# Patient Record
Sex: FEMALE | Race: WHITE | NOT HISPANIC OR LATINO | Employment: FULL TIME | URBAN - METROPOLITAN AREA
[De-identification: names, ages, dates, MRNs, and addresses within clinical notes are randomized per-mention and may not be internally consistent; named-entity substitution may affect disease eponyms.]

---

## 2017-10-16 ENCOUNTER — OFFICE VISIT (OUTPATIENT)
Dept: FAMILY MEDICINE CLINIC | Facility: CLINIC | Age: 27
End: 2017-10-16

## 2017-10-16 VITALS
WEIGHT: 199 LBS | DIASTOLIC BLOOD PRESSURE: 64 MMHG | SYSTOLIC BLOOD PRESSURE: 115 MMHG | HEART RATE: 84 BPM | TEMPERATURE: 98 F | RESPIRATION RATE: 16 BRPM | BODY MASS INDEX: 31.23 KG/M2 | HEIGHT: 67 IN | OXYGEN SATURATION: 98 %

## 2017-10-16 DIAGNOSIS — J30.2 CHRONIC SEASONAL ALLERGIC RHINITIS, UNSPECIFIED TRIGGER: Primary | ICD-10-CM

## 2017-10-16 PROBLEM — F32.A DEPRESSION: Status: ACTIVE | Noted: 2017-10-16

## 2017-10-16 PROBLEM — M51.369 DDD (DEGENERATIVE DISC DISEASE), LUMBAR: Status: ACTIVE | Noted: 2017-10-16

## 2017-10-16 PROBLEM — Z15.01 BRCA1 POSITIVE: Status: ACTIVE | Noted: 2017-10-16

## 2017-10-16 PROBLEM — M51.36 DDD (DEGENERATIVE DISC DISEASE), LUMBAR: Status: ACTIVE | Noted: 2017-10-16

## 2017-10-16 PROBLEM — Z15.09 BRCA1 POSITIVE: Status: ACTIVE | Noted: 2017-10-16

## 2017-10-16 PROBLEM — F41.9 ANXIETY: Status: ACTIVE | Noted: 2017-10-16

## 2017-10-16 PROCEDURE — 99203 OFFICE O/P NEW LOW 30 MIN: CPT | Performed by: INTERNAL MEDICINE

## 2017-10-16 RX ORDER — CETIRIZINE HYDROCHLORIDE, PSEUDOEPHEDRINE HYDROCHLORIDE 5; 120 MG/1; MG/1
1 TABLET, FILM COATED, EXTENDED RELEASE ORAL 2 TIMES DAILY
Qty: 30 TABLET | Refills: 6 | Status: SHIPPED | OUTPATIENT
Start: 2017-10-16 | End: 2018-08-14 | Stop reason: SDUPTHER

## 2017-10-16 RX ORDER — CETIRIZINE HYDROCHLORIDE 10 MG/1
10 TABLET ORAL DAILY
COMMUNITY
End: 2018-02-19 | Stop reason: SDUPTHER

## 2017-10-17 PROBLEM — J30.2 SEASONAL ALLERGIC RHINITIS: Status: ACTIVE | Noted: 2017-10-17

## 2017-10-17 NOTE — PROGRESS NOTES
Subjective   Precious Daniels is a 27 y.o. female. Patient is here today for   Chief Complaint   Patient presents with   • Allergies     new patient           Vitals:    10/16/17 1424   BP: 115/64   Pulse: 84   Resp: 16   Temp: 98 °F (36.7 °C)   SpO2: 98%       Past Medical History:   Diagnosis Date   • Allergic    • Anxiety    • Hypothyroidism       Allergies   Allergen Reactions   • Molds & Smuts    • Penicillins       Social History     Social History   • Marital status:      Spouse name: N/A   • Number of children: N/A   • Years of education: N/A     Occupational History   • Not on file.     Social History Main Topics   • Smoking status: Never Smoker   • Smokeless tobacco: Never Used   • Alcohol use Yes   • Drug use: Not on file   • Sexual activity: Not on file     Other Topics Concern   • Not on file     Social History Narrative   • No narrative on file        Current Outpatient Prescriptions:   •  cetirizine (zyrTEC) 10 MG tablet, Take 10 mg by mouth Daily., Disp: , Rfl:   •  cetirizine-pseudoephedrine (ZYRTEC-D ALLERGY & CONGESTION) 5-120 MG per 12 hr tablet, Take 1 tablet by mouth 2 (Two) Times a Day., Disp: 30 tablet, Rfl: 6     Objective     HPI Comments:   This 27-year-old young lady wishes to establish her care at this office.    She has a history of seasonal allergic rhinitis.    Allergies          Review of Systems   Constitutional: Negative.    HENT: Negative.    Respiratory: Negative.    Cardiovascular: Negative.    Musculoskeletal: Negative.    Psychiatric/Behavioral: Negative.        Physical Exam   Constitutional: She is oriented to person, place, and time. She appears well-developed and well-nourished.   Pleasant, neatly groomed, BMI 31.   HENT:   Head: Normocephalic and atraumatic.   Pulmonary/Chest: Effort normal.   Neurological: She is alert and oriented to person, place, and time.   Psychiatric: She has a normal mood and affect. Her behavior is normal.   Nursing note and vitals  reviewed.        Problem List Items Addressed This Visit        Respiratory    Seasonal allergic rhinitis - Primary            PLAN  I gave her prescription for Zyrtec-D.    She may follow-up for a comprehensive physical examination at her convenience.  No Follow-up on file.

## 2018-02-01 ENCOUNTER — TELEPHONE (OUTPATIENT)
Dept: FAMILY MEDICINE CLINIC | Facility: CLINIC | Age: 28
End: 2018-02-01

## 2018-02-01 RX ORDER — OSELTAMIVIR PHOSPHATE 75 MG/1
75 CAPSULE ORAL DAILY
Qty: 10 CAPSULE | Refills: 0 | Status: SHIPPED | OUTPATIENT
Start: 2018-02-01 | End: 2018-02-19

## 2018-02-01 NOTE — TELEPHONE ENCOUNTER
-----OK DONE       Message from Bharti Hanley MA sent at 2/1/2018 11:39 AM EST -----  Contact: PT  PT SAYS HER WIFE HAS CONFIRMED FLU AND WAS PRESCRIBED TAMIFLU PT WOULD LIKE TAMIFLU CALLED INTO HER PHARMACY FOR HER   PT IS USING [id number] - [place], [place] - [address] [place] [address] AT [facility] [place] [facility] [place] [address] [place]  - [phone]  - [phone] FX    PT CAN BE REACHED [phone]

## 2018-02-19 ENCOUNTER — OFFICE VISIT (OUTPATIENT)
Dept: FAMILY MEDICINE CLINIC | Facility: CLINIC | Age: 28
End: 2018-02-19

## 2018-02-19 VITALS
HEIGHT: 67 IN | TEMPERATURE: 98.2 F | OXYGEN SATURATION: 97 % | WEIGHT: 211.2 LBS | RESPIRATION RATE: 16 BRPM | BODY MASS INDEX: 33.15 KG/M2 | DIASTOLIC BLOOD PRESSURE: 70 MMHG | HEART RATE: 81 BPM | SYSTOLIC BLOOD PRESSURE: 120 MMHG

## 2018-02-19 DIAGNOSIS — R00.2 PALPITATIONS: ICD-10-CM

## 2018-02-19 DIAGNOSIS — R07.89 ATYPICAL CHEST PAIN: Primary | ICD-10-CM

## 2018-02-19 DIAGNOSIS — K21.9 GASTROESOPHAGEAL REFLUX DISEASE, ESOPHAGITIS PRESENCE NOT SPECIFIED: ICD-10-CM

## 2018-02-19 DIAGNOSIS — Z83.3 FAMILY HISTORY OF TYPE 2 DIABETES MELLITUS: ICD-10-CM

## 2018-02-19 PROCEDURE — 99214 OFFICE O/P EST MOD 30 MIN: CPT | Performed by: INTERNAL MEDICINE

## 2018-02-19 PROCEDURE — 93000 ELECTROCARDIOGRAM COMPLETE: CPT | Performed by: INTERNAL MEDICINE

## 2018-02-19 NOTE — PROGRESS NOTES
Subjective   Precious Daniels is a 27 y.o. female. Patient is here today for   Chief Complaint   Patient presents with   • Diabetes     pt states has family history of diabetes and just wants to make sure she is up to date on labs    • Breast Problem     pt states would like to make sure she is up to date on mammo, because she does have a family history of breast cancer           Vitals:    02/19/18 0811   BP: 120/70   Pulse: 81   Resp: 16   Temp: 98.2 °F (36.8 °C)   SpO2: 97%       Past Medical History:   Diagnosis Date   • Allergic    • Anxiety    • Hypothyroidism       Allergies   Allergen Reactions   • Molds & Smuts    • Penicillins       Social History     Social History   • Marital status:      Spouse name: N/A   • Number of children: N/A   • Years of education: N/A     Occupational History   • Not on file.     Social History Main Topics   • Smoking status: Never Smoker   • Smokeless tobacco: Never Used   • Alcohol use Yes   • Drug use: Not on file   • Sexual activity: Not on file     Other Topics Concern   • Not on file     Social History Narrative        Current Outpatient Prescriptions:   •  cetirizine-pseudoephedrine (ZYRTEC-D ALLERGY & CONGESTION) 5-120 MG per 12 hr tablet, Take 1 tablet by mouth 2 (Two) Times a Day., Disp: 30 tablet, Rfl: 6     Objective     HPI Comments: This patient has had 3-4 episodes over the last 2-3 months which consist of a fluttering sensation in her chest at times with radiation into her neck.  When these occasions occur, it is not when she is exercising which she doesn't regular basis.    She does not have any associated nausea vomiting or diaphoresis.    Her episodes last for 20 or 30 minutes to time before they resolved.  Next    She has a family history significant for early coronary artery disease in her father and in every one of his several brothers.    She has a family history significant for type 2 diabetes.    She has a personal history of morbid obesity.  She is  status post gastric sleeve I believe.  When she was at her heaviest she weighed about 280 pounds.    She has frequent reflux symptoms that bother her mostly at night and she is going to bed.  She carries Tums or Rolaids both in her purse and keeps him at the bedside as well.    Diabetes     Breast Problem          Review of Systems   Constitutional: Negative.    HENT: Negative.    Respiratory: Negative.    Cardiovascular:        As described in the history of present illness.   Musculoskeletal: Negative.    Psychiatric/Behavioral: Negative.        Physical Exam   Constitutional: She appears well-developed and well-nourished. No distress.   HENT:   Head: Normocephalic and atraumatic.   Cardiovascular: Normal rate, regular rhythm and normal heart sounds.    No murmur heard.  Pulmonary/Chest: Effort normal and breath sounds normal.   Psychiatric: She has a normal mood and affect. Her behavior is normal. Thought content normal.   Nursing note and vitals reviewed.    An electrocardiogram today showed normal sinus rhythm with sinus arrhythmia.  There were no ST or T wave changes suggestive of ischemia.    Problem List Items Addressed This Visit        Cardiovascular and Mediastinum    Palpitations    Relevant Orders    Ambulatory Referral to Cardiology       Digestive    GERD (gastroesophageal reflux disease) - Primary       Nervous and Auditory    Atypical chest pain    Relevant Orders    Ambulatory Referral to Cardiology       Other    Family history of type 2 diabetes mellitus            PLAN  She and I had a lengthy discussion about insulin resistance, family history of diabetes and the development of type 2 diabetes in association with obesity.      I like to get some lab work.  She requested that I give her an order so that she could go to [facility] and I did today.  She will get the following labs: Hemoglobin A1c, insulin level, CMP, CBC, urinalysis, TSH with reflex free T4.    I recommended that she try  over-the-counter omeprazole for her reflux symptoms.    I made a referral for her to see cardiology about her atypical chest pain.  I explained that I felt that she may get some testing to sort this problem out.  No Follow-up on file.

## 2018-03-08 ENCOUNTER — OFFICE VISIT (OUTPATIENT)
Dept: CARDIOLOGY | Facility: CLINIC | Age: 28
End: 2018-03-08

## 2018-03-08 VITALS
SYSTOLIC BLOOD PRESSURE: 100 MMHG | BODY MASS INDEX: 33.59 KG/M2 | DIASTOLIC BLOOD PRESSURE: 60 MMHG | WEIGHT: 209 LBS | HEIGHT: 66 IN | HEART RATE: 73 BPM

## 2018-03-08 DIAGNOSIS — R00.2 PALPITATIONS: Primary | ICD-10-CM

## 2018-03-08 PROCEDURE — 99203 OFFICE O/P NEW LOW 30 MIN: CPT | Performed by: INTERNAL MEDICINE

## 2018-03-08 PROCEDURE — 93000 ELECTROCARDIOGRAM COMPLETE: CPT | Performed by: INTERNAL MEDICINE

## 2018-03-08 NOTE — PROGRESS NOTES
Date of Office Visit: 2018  Encounter Provider: Cosme Bradley MD  Place of Service: [facility] CARDIOLOGY  Patient Name: Precious Daniels  :1990  Chavez Yeung MD    Chief Complaint   Patient presents with   • Palpitations     History of Present Illness  The patient is a 89-year-old white female who was kindly referred by Dr. Curry for evaluation of palpitations.    The patient's symptoms began several months ago.  She feels strange sensation in her upper precordium.  Occasional she'll notice that is cool sensation up in her neck and her jaw.  Her episodes can last for a few minutes at a time.  They are not provoked by any exertional activity.  They do not awaken her from sleep.  She does not have any discomfort when she exerts herself on a routine basis.  Most of the time her symptoms occur just prior to eating.    This patient has had a history of obesity for many many years.  At one point she had a gastric banding operation which she lost about 150 pounds.  She is now back up to 209 pounds again of roughly 60 pounds since her apparatus was deflated.  She has a remote history of thyroid disease as well as sleep apnea.  Both the sleep apnea and thyroid disease appear to have resolved over time.      Past Medical History:   Diagnosis Date   • Allergic    • Anxiety    • Chest pain    • GERD (gastroesophageal reflux disease)    • Hypothyroidism    • Palpitations          Past Surgical History:   Procedure Laterality Date   • CHOLECYSTECTOMY     • LAPAROSCOPIC GASTRIC BANDING             Current Outpatient Prescriptions:   •  cetirizine-pseudoephedrine (ZYRTEC-D ALLERGY & CONGESTION) 5-120 MG per 12 hr tablet, Take 1 tablet by mouth 2 (Two) Times a Day., Disp: 30 tablet, Rfl: 6      Social History     Social History   • Marital status:      Spouse name: N/A   • Number of children: N/A   • Years of education: N/A     Occupational History   • Not on file.     Social  "History Main Topics   • Smoking status: Never Smoker   • Smokeless tobacco: Never Used   • Alcohol use Yes   • Drug use: Not on file   • Sexual activity: Not on file     Other Topics Concern   • Not on file     Social History Narrative         Review of Systems   Constitution: Negative.   HENT: Negative.    Eyes: Negative.    Cardiovascular: Negative.    Respiratory: Negative.    Endocrine: Negative.    Skin: Negative.    Musculoskeletal: Negative.    Gastrointestinal: Negative.    Neurological: Negative.    Psychiatric/Behavioral: Negative.        Procedures      ECG 12 Lead  Date/Time: 3/8/2018 1:32 PM  Performed by: NESSA RAWLS  Authorized by: NESSA RAWLS   Comparison: compared with previous ECG from 2/19/2018  Similar to previous ECG  Rhythm: sinus rhythm  Rate: normal  Conduction: conduction normal  ST Segments: ST segments normal  T Waves: T waves normal  QRS axis: right                 Objective:    /60  Pulse 73  Ht 167.6 cm (66\")  Wt 94.8 kg (209 lb)  BMI 33.73 kg/m2        Physical Exam   Constitutional: She is oriented to person, place, and time. She appears well-developed and well-nourished.   Overweight   HENT:   Head: Normocephalic.   Eyes: Pupils are equal, round, and reactive to light.   Neck: Normal range of motion. No JVD present. Carotid bruit is not present. No thyromegaly present.   Cardiovascular: Normal rate, regular rhythm, S1 normal, S2 normal, normal heart sounds and intact distal pulses.  Exam reveals no gallop and no friction rub.    No murmur heard.  Pulmonary/Chest: Effort normal and breath sounds normal.   Abdominal: Soft. Bowel sounds are normal.   Musculoskeletal: She exhibits no edema.   Neurological: She is alert and oriented to person, place, and time.   Skin: Skin is warm, dry and intact. No erythema.   Psychiatric: She has a normal mood and affect.   Vitals reviewed.          Assessment:       Diagnosis Plan   1. Palpitations       The patient has very " vague symptoms.  He didn't auscultate any arrhythmia on exam.  There were no murmurs noted.  Her electrocardiogram remains normal.  I honestly think what is playing this patient is anxiety issues.  Her symptoms occur just prior to eating.  She states eating for her is extremely stressful because of the intensity it takes for her to make the right choices.  What may be more beneficial to this patient and asked expressed this to her is some form of psychological counseling to help her deal with her daily stress and her obesity.  I don't think any cardiac testing is indicated at this point.  At her age with her risk factor profile I don't think she has coronary disease.   Plan:       Preshaped the opportunity to evaluate this patient in consultation

## 2018-08-14 RX ORDER — CETIRIZINE HCL/PSEUDOEPHEDRINE 5 MG-120MG
TABLET, EXTENDED RELEASE 12 HR ORAL
Qty: 60 TABLET | Refills: 0 | Status: SHIPPED | OUTPATIENT
Start: 2018-08-14 | End: 2018-11-20 | Stop reason: SDUPTHER

## 2018-11-21 RX ORDER — CETIRIZINE HCL/PSEUDOEPHEDRINE 5 MG-120MG
TABLET, EXTENDED RELEASE 12 HR ORAL
Qty: 180 TABLET | Refills: 0 | Status: SHIPPED | OUTPATIENT
Start: 2018-11-21 | End: 2019-02-26 | Stop reason: SDUPTHER

## 2019-01-21 DIAGNOSIS — Z00.00 ROUTINE HEALTH MAINTENANCE: Primary | ICD-10-CM

## 2019-02-01 ENCOUNTER — CLINICAL SUPPORT (OUTPATIENT)
Dept: FAMILY MEDICINE CLINIC | Facility: CLINIC | Age: 29
End: 2019-02-01

## 2019-02-01 DIAGNOSIS — Z00.00 ENCOUNTER FOR PREVENTIVE HEALTH EXAMINATION: Primary | ICD-10-CM

## 2019-02-01 LAB
ALBUMIN SERPL-MCNC: 4.2 G/DL (ref 3.5–5.2)
ALBUMIN/GLOB SERPL: 1.8 G/DL
ALP SERPL-CCNC: 67 U/L (ref 39–117)
ALT SERPL-CCNC: 13 U/L (ref 1–33)
APPEARANCE UR: ABNORMAL
AST SERPL-CCNC: 13 U/L (ref 1–32)
BACTERIA #/AREA URNS HPF: ABNORMAL /HPF
BILIRUB SERPL-MCNC: 0.3 MG/DL (ref 0.1–1.2)
BILIRUB UR QL STRIP: NEGATIVE
BUN SERPL-MCNC: 8 MG/DL (ref 6–20)
BUN/CREAT SERPL: 11.8 (ref 7–25)
CALCIUM SERPL-MCNC: 9.7 MG/DL (ref 8.6–10.5)
CASTS URNS MICRO: ABNORMAL
CHLORIDE SERPL-SCNC: 100 MMOL/L (ref 98–107)
CHOLEST SERPL-MCNC: 170 MG/DL (ref 0–200)
CO2 SERPL-SCNC: 26.7 MMOL/L (ref 22–29)
COLOR UR: YELLOW
CREAT SERPL-MCNC: 0.68 MG/DL (ref 0.57–1)
CRYSTALS URNS MICRO: ABNORMAL
EPI CELLS #/AREA URNS HPF: ABNORMAL /HPF
GLOBULIN SER CALC-MCNC: 2.4 GM/DL
GLUCOSE SERPL-MCNC: 75 MG/DL (ref 65–99)
GLUCOSE UR QL: NEGATIVE
HDLC SERPL-MCNC: 69 MG/DL (ref 40–60)
HGB UR QL STRIP: ABNORMAL
KETONES UR QL STRIP: ABNORMAL
LDLC SERPL CALC-MCNC: 89 MG/DL (ref 0–100)
LDLC/HDLC SERPL: 1.3 {RATIO}
LEUKOCYTE ESTERASE UR QL STRIP: ABNORMAL
MUCOUS THREADS URNS QL MICRO: ABNORMAL /HPF
NITRITE UR QL STRIP: NEGATIVE
PH UR STRIP: <=5 [PH] (ref 5–8)
POTASSIUM SERPL-SCNC: 4.4 MMOL/L (ref 3.5–5.2)
PROT SERPL-MCNC: 6.6 G/DL (ref 6–8.5)
PROT UR QL STRIP: NEGATIVE
RBC #/AREA URNS HPF: ABNORMAL /HPF
SODIUM SERPL-SCNC: 142 MMOL/L (ref 136–145)
SP GR UR: ABNORMAL (ref 1–1.03)
TRIGL SERPL-MCNC: 58 MG/DL (ref 0–150)
UROBILINOGEN UR STRIP-MCNC: ABNORMAL MG/DL
VLDLC SERPL CALC-MCNC: 11.6 MG/DL (ref 5–40)
WBC #/AREA URNS HPF: ABNORMAL /HPF

## 2019-02-01 PROCEDURE — 93000 ELECTROCARDIOGRAM COMPLETE: CPT | Performed by: INTERNAL MEDICINE

## 2019-02-01 PROCEDURE — 71046 X-RAY EXAM CHEST 2 VIEWS: CPT | Performed by: INTERNAL MEDICINE

## 2019-02-01 PROCEDURE — 85025 COMPLETE CBC W/AUTO DIFF WBC: CPT | Performed by: INTERNAL MEDICINE

## 2019-02-27 RX ORDER — CETIRIZINE HCL/PSEUDOEPHEDRINE 5 MG-120MG
TABLET, EXTENDED RELEASE 12 HR ORAL
Qty: 60 TABLET | Refills: 0 | Status: SHIPPED | OUTPATIENT
Start: 2019-02-27 | End: 2020-01-24

## 2019-03-21 ENCOUNTER — OFFICE VISIT (OUTPATIENT)
Dept: FAMILY MEDICINE CLINIC | Facility: CLINIC | Age: 29
End: 2019-03-21

## 2019-03-21 VITALS
TEMPERATURE: 98.4 F | HEIGHT: 71 IN | SYSTOLIC BLOOD PRESSURE: 100 MMHG | RESPIRATION RATE: 16 BRPM | WEIGHT: 214.6 LBS | DIASTOLIC BLOOD PRESSURE: 64 MMHG | BODY MASS INDEX: 30.04 KG/M2

## 2019-03-21 DIAGNOSIS — E66.3 OVERWEIGHT (BMI 25.0-29.9): ICD-10-CM

## 2019-03-21 DIAGNOSIS — Z00.00 ROUTINE HEALTH MAINTENANCE: Primary | ICD-10-CM

## 2019-03-21 DIAGNOSIS — Z83.3 FAMILY HISTORY OF TYPE 2 DIABETES MELLITUS: ICD-10-CM

## 2019-03-21 PROBLEM — Z15.01 BRCA1 POSITIVE: Status: RESOLVED | Noted: 2017-10-16 | Resolved: 2019-03-21

## 2019-03-21 PROBLEM — Z15.09 BRCA1 POSITIVE: Status: RESOLVED | Noted: 2017-10-16 | Resolved: 2019-03-21

## 2019-03-21 PROBLEM — F32.A DEPRESSION: Status: RESOLVED | Noted: 2017-10-16 | Resolved: 2019-03-21

## 2019-03-21 PROCEDURE — 99395 PREV VISIT EST AGE 18-39: CPT | Performed by: INTERNAL MEDICINE

## 2019-03-21 RX ORDER — CALCIUM CARBONATE 750 MG/1
750 TABLET, CHEWABLE ORAL DAILY
COMMUNITY

## 2019-03-21 NOTE — PROGRESS NOTES
Subjective   Precious Daniels is a 28 y.o. female. Patient is here today for   Chief Complaint   Patient presents with   • Annual Exam     cpe           Vitals:    03/21/19 1442   BP: 100/64   Resp: 16   Temp: 98.4 °F (36.9 °C)       The following portions of the patient's history were reviewed and updated as appropriate: allergies, current medications, past family history, past medical history, past social history, past surgical history and problem list.    Past Medical History:   Diagnosis Date   • Allergic    • Anxiety    • Chest pain    • GERD (gastroesophageal reflux disease)    • Hypothyroidism    • Palpitations       Allergies   Allergen Reactions   • Molds & Smuts    • Penicillins       Social History     Socioeconomic History   • Marital status:      Spouse name: Not on file   • Number of children: Not on file   • Years of education: Not on file   • Highest education level: Not on file   Tobacco Use   • Smoking status: Never Smoker   • Smokeless tobacco: Never Used   Substance and Sexual Activity   • Alcohol use: Yes        Current Outpatient Medications:   •  calcium carbonate EX (TUMS EX) 750 MG chewable tablet, Chew 750 mg Daily., Disp: , Rfl:   •  Cholecalciferol (VITAMIN D PO), Take  by mouth., Disp: , Rfl:   •  MULTIPLE VITAMINS-MINERALS PO, Take  by mouth., Disp: , Rfl:   •  ZYRTEC-D ALLERGY & CONGESTION 5-120 MG per 12 hr tablet, TAKE 1 TABLET BY MOUTH TWICE DAILY, Disp: 60 tablet, Rfl: 0     EKG  ECG Report    Objective   This pleasant 28-year-old is here for a comprehensive physical exam.  She follows up with gynecologist regularly.    Past surgical history includes laparoscopic banding procedure.    She has a family history of type 2 diabetes.           Precious Daniels 28 y.o. female who presents for an Annual Wellness Visit.  she has a history of   Patient Active Problem List   Diagnosis   • Anxiety   • BRCA1 positive   • DDD (degenerative disc disease), lumbar   • Depression   • Seasonal  allergic rhinitis   • Atypical chest pain   • GERD (gastroesophageal reflux disease)   • Family history of type 2 diabetes mellitus   • Palpitations   .  she has been doing well with new interval problems.  Labs results discussed in detail with the patient.  Plan to update vaccines if needed today.        Lab Results (most recent)     None            Review of Systems   Constitutional: Negative.    HENT: Negative.    Eyes: Negative.    Respiratory: Negative.    Cardiovascular: Negative.    Gastrointestinal: Negative.    Endocrine: Negative.    Genitourinary: Negative.    Musculoskeletal: Negative.    Skin: Negative.    Allergic/Immunologic: Negative.    Neurological: Negative.    Hematological: Negative.    Psychiatric/Behavioral: Negative.        Physical Exam   Constitutional: She is oriented to person, place, and time. She appears well-developed and well-nourished. No distress.   Pleasant, neatly groomed, BMI 29.   HENT:   Head: Normocephalic and atraumatic.   Right Ear: External ear normal.   Left Ear: External ear normal.   Nose: Nose normal.   Mouth/Throat: Oropharynx is clear and moist. No oropharyngeal exudate.   Eyes: Conjunctivae and EOM are normal. Pupils are equal, round, and reactive to light. Right eye exhibits no discharge. Left eye exhibits no discharge. No scleral icterus.   Neck: Normal range of motion. Neck supple. No JVD present. No tracheal deviation present. No thyromegaly present.   Cardiovascular: Normal rate, regular rhythm, normal heart sounds and intact distal pulses. Exam reveals no gallop and no friction rub.   No murmur heard.  Pulmonary/Chest: Effort normal and breath sounds normal. No stridor. No respiratory distress. She has no wheezes. She has no rales. She exhibits no tenderness.   Abdominal: Soft. Bowel sounds are normal. She exhibits no distension and no mass. There is no tenderness. There is no rebound and no guarding. No hernia.   Genitourinary:   Genitourinary Comments:  Deferred to gynecology.   Musculoskeletal: Normal range of motion. She exhibits no edema, tenderness or deformity.   Lymphadenopathy:     She has no cervical adenopathy.   Neurological: She is alert and oriented to person, place, and time.   Skin: Skin is warm and dry. Capillary refill takes less than 2 seconds. No rash noted. She is not diaphoretic. No erythema. No pallor.   Psychiatric: She has a normal mood and affect. Her behavior is normal. Judgment and thought content normal.   Nursing note and vitals reviewed.      ASSESSMENT       Problem List Items Addressed This Visit     None      Visit Diagnoses     Routine health maintenance    -  Primary    Relevant Orders    XR Chest PA & Lateral          PLAN  She and I reviewed her labs.  Everything looked great except that she did have some leukocytes and a nitrite in her urinalysis.  Repeat urinalysis today shows no abnormality.    She is overweight.  She has had a great experience with weight loss regarding the history of bariatric surgery.  She continues to do her best to lose weight.    I asked her to follow-up in about 1 year.  I would do a comprehensive physical examination at that time.  There are no Patient Instructions on file for this visit.    No Follow-up on file.

## 2019-12-04 ENCOUNTER — TELEPHONE (OUTPATIENT)
Dept: FAMILY MEDICINE CLINIC | Facility: CLINIC | Age: 29
End: 2019-12-04

## 2019-12-04 NOTE — TELEPHONE ENCOUNTER
----- Message from Chris May sent at 12/4/2019 11:28 AM EST -----  PT OUT OF TOWN AND A DR AT A CLINIC PRESCRIBED SOME MEDICATION FOR A COLD. AND HAD QUESTIONS ABOUT THE MEDICATION THAT WAS PRESCRIBED.     CEFDINIR 300MG CAPS (TWO DAY/ 7DAYS) HAS TAKEN 5DAYS WORTH.     PT IS HAVING A SIDE AFFECT OF THIS MEDS.  DIARHEA, NAUSEA, ABDOMINAL CRAMPS, MUSCLES WEAKNESS. TALKED TO DR AND HE SAID STOP TAKING IT. PT WANTS DR OPINION.    TOLD PT TO GO [facility] OR ER TO BE SEEN    [phone]    THANKS CHRIS

## 2019-12-04 NOTE — TELEPHONE ENCOUNTER
Yes as we discussed in person, patient should stop taking the medication and needs to be seen for possible complications from side effects and to see about getting another medication if it is still needed. Thanks Chris

## 2020-01-24 RX ORDER — CETIRIZINE HCL/PSEUDOEPHEDRINE 5 MG-120MG
TABLET, EXTENDED RELEASE 12 HR ORAL
Qty: 60 TABLET | Refills: 2 | Status: SHIPPED | OUTPATIENT
Start: 2020-01-24 | End: 2020-04-20 | Stop reason: SDUPTHER

## 2020-04-20 ENCOUNTER — TELEPHONE (OUTPATIENT)
Dept: FAMILY MEDICINE CLINIC | Facility: CLINIC | Age: 30
End: 2020-04-20

## 2020-04-20 RX ORDER — CETIRIZINE HYDROCHLORIDE, PSEUDOEPHEDRINE HYDROCHLORIDE 5; 120 MG/1; MG/1
1 TABLET, FILM COATED, EXTENDED RELEASE ORAL 2 TIMES DAILY
Qty: 180 TABLET | Refills: 0 | Status: SHIPPED | OUTPATIENT
Start: 2020-04-20 | End: 2022-06-02 | Stop reason: SDUPTHER

## 2020-04-20 NOTE — TELEPHONE ENCOUNTER
PATIENT IS REQUESTING A REFILL ON ZYRTEC-D ALLERGY & CONGESTION 5-120 MG per 12 hr tablet.    PLEASE SEND TO [facility] [place]  [address] [place] [address] [phone] [facility] [phone] FX

## 2020-11-10 ENCOUNTER — TELEPHONE (OUTPATIENT)
Dept: FAMILY MEDICINE CLINIC | Facility: CLINIC | Age: 30
End: 2020-11-10

## 2020-11-10 NOTE — TELEPHONE ENCOUNTER
[facility]     We do not have the testing here. Pt needs to be evaluated. Pt needs to be tested for COVID as well. Pt is to go to the Urgent Care treatment center for evaluation.

## 2020-11-10 NOTE — TELEPHONE ENCOUNTER
PATIENT AND PATIENT'S WIFE (MARY) IS EXPERIENCING THE FOLLOWING SYMPTOMS,    DIARRHEA  NAUSEA  MUSCLE ACHES  HEADACHES    PATIENT STATED SHE CAME IN CONTACT WITH [facility] OVER THE WEEKEND AND STARTED TO DEVELOP THESE SYMPTOMS. PATIENT IS REQUESTING TEST KIT TO TEST FOR [facility].    PHARMACY CONFIRMED: [facility] [address] [facility]    PLEASE ADVISE.    PATIENT CALL BACK:  [phone]

## 2021-02-18 RX ORDER — CETIRIZINE HCL/PSEUDOEPHEDRINE 5 MG-120MG
TABLET, EXTENDED RELEASE 12 HR ORAL
Qty: 60 TABLET | OUTPATIENT
Start: 2021-02-18

## 2021-04-16 ENCOUNTER — BULK ORDERING (OUTPATIENT)
Dept: CASE MANAGEMENT | Facility: OTHER | Age: 31
End: 2021-04-16

## 2021-04-16 DIAGNOSIS — Z23 IMMUNIZATION DUE: ICD-10-CM

## 2021-06-14 ENCOUNTER — TELEPHONE (OUTPATIENT)
Dept: FAMILY MEDICINE CLINIC | Facility: CLINIC | Age: 31
End: 2021-06-14

## 2021-06-14 DIAGNOSIS — E66.3 OVERWEIGHT (BMI 25.0-29.9): ICD-10-CM

## 2021-06-14 DIAGNOSIS — Z83.3 FAMILY HISTORY OF TYPE 2 DIABETES MELLITUS: Primary | ICD-10-CM

## 2021-06-18 ENCOUNTER — OFFICE VISIT (OUTPATIENT)
Dept: FAMILY MEDICINE CLINIC | Facility: CLINIC | Age: 31
End: 2021-06-18

## 2021-06-18 ENCOUNTER — HOSPITAL ENCOUNTER (OUTPATIENT)
Dept: GENERAL RADIOLOGY | Facility: HOSPITAL | Age: 31
Discharge: HOME | End: 2021-06-18
Admitting: INTERNAL MEDICINE

## 2021-06-18 VITALS
DIASTOLIC BLOOD PRESSURE: 82 MMHG | WEIGHT: 212 LBS | OXYGEN SATURATION: 98 % | SYSTOLIC BLOOD PRESSURE: 120 MMHG | HEIGHT: 67 IN | TEMPERATURE: 97.3 F | BODY MASS INDEX: 33.27 KG/M2 | HEART RATE: 74 BPM

## 2021-06-18 DIAGNOSIS — N30.00 ACUTE CYSTITIS WITHOUT HEMATURIA: ICD-10-CM

## 2021-06-18 DIAGNOSIS — M54.50 LUMBAR SPINE PAIN: ICD-10-CM

## 2021-06-18 DIAGNOSIS — E66.3 OVERWEIGHT (BMI 25.0-29.9): ICD-10-CM

## 2021-06-18 DIAGNOSIS — M54.50 LUMBAR SPINE PAIN: Primary | ICD-10-CM

## 2021-06-18 PROCEDURE — 72100 X-RAY EXAM L-S SPINE 2/3 VWS: CPT

## 2021-06-18 PROCEDURE — 99214 OFFICE O/P EST MOD 30 MIN: CPT | Performed by: INTERNAL MEDICINE

## 2021-06-18 RX ORDER — NITROFURANTOIN 25; 75 MG/1; MG/1
100 CAPSULE ORAL 2 TIMES DAILY
Qty: 10 CAPSULE | Refills: 0 | Status: SHIPPED | OUTPATIENT
Start: 2021-06-18 | End: 2022-06-02

## 2021-06-18 NOTE — PROGRESS NOTES
Subjective   Precious Daniels is a 30 y.o. female. Patient is here today for   Chief Complaint   Patient presents with   • Annual Exam   • Back Pain     Discuss referral to PT           Vitals:    21 0810   BP: 120/82   Pulse: 74   Temp: 97.3 °F (36.3 °C)   SpO2: 98%     Body mass index is 33.71 kg/m².      Past Medical History:   Diagnosis Date   • Allergic    • Anxiety    • BRCA1 positive 10/16/2017   • Chest pain    • GERD (gastroesophageal reflux disease)    • Hypothyroidism    • Palpitations       Allergies   Allergen Reactions   • Cefdinir Diarrhea   • Molds & Smuts    • Morphine Other (See Comments)     Makes patient agitated, and angry  Makes patient agitated, and angry     • Penicillins       Social History     Socioeconomic History   • Marital status:      Spouse name: Not on file   • Number of children: Not on file   • Years of education: Not on file   • Highest education level: Not on file   Tobacco Use   • Smoking status: Never Smoker   • Smokeless tobacco: Never Used   Vaping Use   • Vaping Use: Never used   Substance and Sexual Activity   • Alcohol use: Yes   • Sexual activity: Defer        Current Outpatient Medications:   •  calcium carbonate EX (TUMS EX) 750 MG chewable tablet, Chew 750 mg Daily., Disp: , Rfl:   •  cetirizine-pseudoephedrine (ZyrTEC-D Allergy & Congestion) 5-120 MG per 12 hr tablet, Take 1 tablet by mouth 2 (Two) Times a Day., Disp: 180 tablet, Rfl: 0  •  Prenatal Multivit-Min-Fe-FA (Pre- Formula) tablet, , Disp: , Rfl:   •  nitrofurantoin, macrocrystal-monohydrate, (Macrobid) 100 MG capsule, Take 1 capsule by mouth 2 (Two) Times a Day., Disp: 10 capsule, Rfl: 0     Objective     This patient is here for follow-up on labs done last week.    The past month complained of lumbar spine pain with radiation to the buttocks bilaterally.    She has twinges of pain the paraspinous muscles running superiorly bilaterally.  He is bit better now than she was when this started a  month ago.  Notes no preceding traumatic event.  It bothers her especially when she lays down to go to sleep at night.  She is a side sleeper and that tends to exacerbate her pain.    She had some labs done last week.       Review of Systems   Constitutional: Negative.    HENT: Negative.    Respiratory: Negative.    Cardiovascular: Negative.    Musculoskeletal: Negative.    Psychiatric/Behavioral: Negative.        Physical Exam  Vitals and nursing note reviewed.   Constitutional:       Appearance: Normal appearance. She is obese. She is not ill-appearing or diaphoretic.      Comments: Pleasant, neatly groomed, in no distress.   Cardiovascular:      Rate and Rhythm: Regular rhythm.      Heart sounds: Normal heart sounds. No murmur heard.   No gallop.    Musculoskeletal:      Comments: No pain to percussion of the lumbar spine.  No pain on palpation.   Neurological:      Mental Status: She is alert and oriented to person, place, and time.   Psychiatric:         Mood and Affect: Mood normal.         Behavior: Behavior normal.         Thought Content: Thought content normal.           Problems Addressed this Visit        Endocrine and Metabolic    Overweight (BMI 25.0-29.9)       Genitourinary and Reproductive     Acute cystitis without hematuria    Relevant Medications    nitrofurantoin, macrocrystal-monohydrate, (Macrobid) 100 MG capsule       Musculoskeletal and Injuries    Lumbar spine pain - Primary    Relevant Orders    Ambulatory Referral to Physical Therapy Evaluate and treat    XR Spine Lumbar 2 or 3 View      Diagnoses       Codes Comments    Lumbar spine pain    -  Primary ICD-10-CM: M54.5  ICD-9-CM: 724.2     Overweight (BMI 25.0-29.9)     ICD-10-CM: E66.3  ICD-9-CM: 278.02     Acute cystitis without hematuria     ICD-10-CM: N30.00  ICD-9-CM: 595.0             PLAN  She and I reviewed her labs.  Cholesterol looks good, comprehensive metabolic panel looks good.  Her urinalysis suggest that she has a urinary  tract infection.  I sent out a prescription for nitrofurantoin for her.    I put an order in for her to have a lumbar spine x-ray and made a referral for her to see physical therapy.    She and I briefly discussed weight loss and she is making efforts to lose weight.    She may take over-the-counter anti-inflammatories.  Aleve, or ibuprofen) as needed.    If she fails to improve with physical therapy, she will let me know and we will take it from there.  No follow-ups on file.

## 2021-06-23 ENCOUNTER — TELEPHONE (OUTPATIENT)
Dept: FAMILY MEDICINE CLINIC | Facility: CLINIC | Age: 31
End: 2021-06-23

## 2021-06-23 NOTE — TELEPHONE ENCOUNTER
PATIENT FINISHED THE LABS AND INQUIRED IF SHE NEEDS ANOTHER LAB TO DETERMINE IF THE BACTERIA IS GONE.  PLEASE ADVISE    CALL BACK #: [phone]

## 2021-06-28 DIAGNOSIS — N30.00 ACUTE CYSTITIS WITHOUT HEMATURIA: Primary | ICD-10-CM

## 2021-06-28 NOTE — TELEPHONE ENCOUNTER
PER DR. VIKTORIA RICKS TO ORDER A UA, CALLED AND INFORMED PT UA ORDERED AND SHE IS TO CALL OFFICE BACK TO SCHEDULE

## 2021-06-30 DIAGNOSIS — N30.00 ACUTE CYSTITIS WITHOUT HEMATURIA: Primary | ICD-10-CM

## 2021-07-01 LAB
APPEARANCE UR: ABNORMAL
BACTERIA #/AREA URNS HPF: ABNORMAL /HPF
BILIRUB UR QL STRIP: ABNORMAL
CASTS URNS MICRO: ABNORMAL
COLOR UR: ABNORMAL
CRYSTALS URNS MICRO: ABNORMAL
EPI CELLS #/AREA URNS HPF: ABNORMAL /HPF
GLUCOSE UR QL: NEGATIVE
HGB UR QL STRIP: NEGATIVE
KETONES UR QL STRIP: ABNORMAL
LEUKOCYTE ESTERASE UR QL STRIP: ABNORMAL
MUCOUS THREADS URNS QL MICRO: ABNORMAL /HPF
NITRITE UR QL STRIP: NEGATIVE
PH UR STRIP: 5.5 [PH] (ref 5–8)
PROT UR QL STRIP: ABNORMAL
RBC #/AREA URNS HPF: ABNORMAL /HPF
SP GR UR: ABNORMAL (ref 1–1.03)
UROBILINOGEN UR STRIP-MCNC: ABNORMAL MG/DL
WBC #/AREA URNS HPF: ABNORMAL /HPF

## 2021-07-02 ENCOUNTER — OFFICE VISIT (OUTPATIENT)
Dept: FAMILY MEDICINE CLINIC | Facility: CLINIC | Age: 31
End: 2021-07-02

## 2021-07-02 VITALS
SYSTOLIC BLOOD PRESSURE: 130 MMHG | OXYGEN SATURATION: 98 % | BODY MASS INDEX: 33.52 KG/M2 | TEMPERATURE: 97.7 F | HEIGHT: 66 IN | DIASTOLIC BLOOD PRESSURE: 72 MMHG | HEART RATE: 93 BPM | RESPIRATION RATE: 18 BRPM | WEIGHT: 208.6 LBS

## 2021-07-02 DIAGNOSIS — R80.9 PROTEINURIA, UNSPECIFIED TYPE: ICD-10-CM

## 2021-07-02 DIAGNOSIS — R31.9 HEMATURIA, UNSPECIFIED TYPE: Primary | ICD-10-CM

## 2021-07-02 LAB
BILIRUB BLD-MCNC: ABNORMAL MG/DL
CLARITY, POC: ABNORMAL
COLOR UR: ABNORMAL
GLUCOSE UR STRIP-MCNC: NEGATIVE MG/DL
KETONES UR QL: NEGATIVE
LEUKOCYTE EST, POC: NEGATIVE
NITRITE UR-MCNC: NEGATIVE MG/ML
PH UR: 6 [PH] (ref 5–8)
PROT UR STRIP-MCNC: ABNORMAL MG/DL
RBC # UR STRIP: ABNORMAL /UL
SP GR UR: 1.02 (ref 1–1.03)
UROBILINOGEN UR QL: NORMAL

## 2021-07-02 PROCEDURE — 99213 OFFICE O/P EST LOW 20 MIN: CPT | Performed by: NURSE PRACTITIONER

## 2021-07-02 PROCEDURE — 81003 URINALYSIS AUTO W/O SCOPE: CPT | Performed by: NURSE PRACTITIONER

## 2021-07-02 NOTE — PROGRESS NOTES
"Subjective     Precious Daniels is a 30 y.o.. female.     Pt here today following up on recent Cystitis. Pt stating she was asymptomatic and dx of routine u/a. Pt stating she was prescribed nitrofurantoin 100 mg bid x 5 days, she took as prescribed and completed on Wednesday this week. Pt denies any current issues. Pt denies any history of recurrent UTI's.      The following portions of the patient's history were reviewed and updated as appropriate: allergies, current medications, past family history, past medical history, past social history, past surgical history and problem list.    Past Medical History:   Diagnosis Date   • Allergic    • Anxiety    • BRCA1 positive 10/16/2017   • Chest pain    • GERD (gastroesophageal reflux disease)    • Hypothyroidism    • Palpitations        Past Surgical History:   Procedure Laterality Date   • CHOLECYSTECTOMY     • LAPAROSCOPIC GASTRIC BANDING         Review of Systems   Constitutional: Negative for fever.   Gastrointestinal: Negative for nausea and vomiting.   Genitourinary: Negative for dysuria, flank pain, frequency, hematuria and urgency.       Allergies   Allergen Reactions   • Cefdinir Diarrhea   • Molds & Smuts    • Morphine Other (See Comments)     Makes patient agitated, and angry  Makes patient agitated, and angry     • Penicillins        Objective     Vitals:    07/02/21 0833   BP: 130/72   BP Location: Left arm   Patient Position: Sitting   Pulse: 93   Resp: 18   Temp: 97.7 °F (36.5 °C)   TempSrc: Oral   SpO2: 98%   Weight: 94.6 kg (208 lb 9.6 oz)   Height: 168.9 cm (66.5\")     Body mass index is 33.17 kg/m².    Physical Exam  Vitals reviewed.   HENT:      Head: Normocephalic.   Eyes:      Pupils: Pupils are equal, round, and reactive to light.   Cardiovascular:      Rate and Rhythm: Normal rate and regular rhythm.   Pulmonary:      Effort: Pulmonary effort is normal.      Breath sounds: Normal breath sounds.   Musculoskeletal:         General: Normal range of " motion.   Neurological:      Mental Status: She is alert and oriented to person, place, and time.   Psychiatric:         Behavior: Behavior normal.           Current Outpatient Medications:   •  calcium carbonate EX (TUMS EX) 750 MG chewable tablet, Chew 750 mg Daily., Disp: , Rfl:   •  cetirizine-pseudoephedrine (ZyrTEC-D Allergy & Congestion) 5-120 MG per 12 hr tablet, Take 1 tablet by mouth 2 (Two) Times a Day., Disp: 180 tablet, Rfl: 0  •  Prenatal Multivit-Min-Fe-FA (Pre-Iris Formula) tablet, , Disp: , Rfl:   •  nitrofurantoin, macrocrystal-monohydrate, (Macrobid) 100 MG capsule, Take 1 capsule by mouth 2 (Two) Times a Day., Disp: 10 capsule, Rfl: 0    Recent Results (from the past 168 hour(s))   Urinalysis With Microscopic - Urine, Clean Catch    Collection Time: 21  8:56 AM    Specimen: Urine, Clean Catch   Result Value Ref Range    Specific Gravity, UA Comment 1.005 - 1.030    pH, UA 5.5 5.0 - 8.0    Color, UA See below: (A)     Appearance, UA Cloudy (A) Clear    Leukocytes, UA See below: (A) Negative    Protein Trace (A) Negative    Glucose, UA Negative Negative    Ketones Trace (A) Negative    Blood, UA Negative Negative    Bilirubin, UA See below: (A) Negative    Urobilinogen, UA Comment     Nitrite, UA Negative Negative   Microscopic Examination -    Collection Time: 21  8:56 AM   Result Value Ref Range    WBC, UA See below: (A) /HPF    RBC, UA 0-2 /HPF    Epithelial Cells (non renal) 3-6 (A) /HPF    Cast Type Comment     Crystal Type Comment     Mucus, UA See below: (A) /HPF    Bacteria, UA 4+ (A) None Seen /HPF   POCT urinalysis dipstick, automated    Collection Time: 21  8:43 AM    Specimen: Urine   Result Value Ref Range    Color Dark Yellow Yellow, Straw, Dark Yellow, Kelsey    Clarity, UA Cloudy (A) Clear    Specific Gravity  1.025 1.005 - 1.030    pH, Urine 6.0 5.0 - 8.0    Leukocytes Negative Negative    Nitrite, UA Negative Negative    Protein, POC 30 mg/dL (A) Negative mg/dL     Glucose, UA Negative Negative, 1000 mg/dL (3+) mg/dL    Ketones, UA Negative Negative    Urobilinogen, UA Normal Normal    Bilirubin Small (1+) (A) Negative    Blood, UA Small (A) Negative           Assessment/Plan   Diagnoses and all orders for this visit:    1. Hematuria, unspecified type (Primary)  -     POCT urinalysis dipstick, automated    2. Proteinuria, unspecified type        Patient Instructions   Drink plenty of water, decrease seltzer water and drink more non-sparkling water      Return for follow up in 2-3 months for re-eval.of hematuria/proteinuria.

## 2021-07-07 ENCOUNTER — TREATMENT (OUTPATIENT)
Dept: PHYSICAL THERAPY | Facility: CLINIC | Age: 31
End: 2021-07-07

## 2021-07-07 DIAGNOSIS — S39.012D STRAIN OF LUMBAR REGION, SUBSEQUENT ENCOUNTER: ICD-10-CM

## 2021-07-07 DIAGNOSIS — M54.50 BILATERAL LOW BACK PAIN WITHOUT SCIATICA, UNSPECIFIED CHRONICITY: Primary | ICD-10-CM

## 2021-07-07 PROCEDURE — 97110 THERAPEUTIC EXERCISES: CPT | Performed by: PHYSICAL THERAPIST

## 2021-07-07 PROCEDURE — 97161 PT EVAL LOW COMPLEX 20 MIN: CPT | Performed by: PHYSICAL THERAPIST

## 2021-07-07 NOTE — PROGRESS NOTES
Physical Therapy Initial Evaluation and Plan of Care    Patient: Precious Daniels   : 1990  Diagnosis/ICD-10 Code:  Bilateral low back pain without sciatica, unspecified chronicity [M54.5]  Referring practitioner: Chavez Yeung MD  Past medical Hx reviewed: 2021  Subjective Evaluation    History of Present Illness  Onset date: 4-6 weeks ago.  Mechanism of injury: After sitting for a long period of time at work and getting home to rest, I got up and lifted some trash and I felt a zing in my back. The night of the lift I felt some deep warmth in the low back and some tightening in the low back.  It wasn't warm to touch but was on the inside.  The doctor checked my back and did x-ray and everything.    I also work from home most of the time (Changes Aug. 1st) and I am using guest room as office.  I am able to walk 30-60 min on TM without increased pain.       Patient Occupation: : Full time: 95% sitting.  Quality of life: excellent    Pain  Current pain ratin  At best pain ratin  At worst pain rating: 3 (After sitting and then getting up.)  Location: Across the lower back and upper buttocks (B) equally.    Quality: radiating and tight  Relieving factors: rest (Stretching)  Aggravating factors: prolonged positioning and lifting (standing at the sink)    Social Support  Lives in: multiple-level home  Lives with: spouse (Pets.   )    Diagnostic Tests  X-ray: normal    Treatments  Previous treatment: physical therapy  Patient Goals  Patient goals for therapy: decreased pain, increased motion, increased strength and return to sport/leisure activities      PLOF: Independent.   Objective          Active Range of Motion     Lumbar   Flexion: 90 degrees   Extension: 20 degrees   Left lateral flexion: 30 degrees   Right lateral flexion: 35 degrees   Left rotation: 48 degrees   Right rotation: 52 degrees   Left Hip   Flexion: 125 degrees   Internal rotation (90/90): 20 degrees     Right Hip    Flexion: 140 degrees   Internal rotation (90/90): 30 degrees     Strength/Myotome Testing     Lumbar   Left   Normal strength    Right   Normal strength    Ambulation     Ambulation: Level Surfaces   Ambulation without assistive device: independent     Assessment & Plan     Assessment  Impairments: abnormal or restricted ROM, activity intolerance, impaired physical strength, lacks appropriate home exercise program and pain with function  Assessment details: Pt presents to PT with symptoms consistent with lumbar strain and trunk weakness.  Pt would benefit from skilled PT intervention to address the deficits noted.     Prognosis: good  Functional Limitations: carrying objects, lifting, sleeping, walking, uncomfortable because of pain, moving in bed, sitting and unable to perform repetitive tasks  Goals  Plan Goals: SHORT TERM GOALS: Time for Goal Achievement: 4visits    1.  Patient to be compliant and progression of HEP                             2.  Pain level < 3/10 at worst with mentioned activities to improve function  3.  Increased thoracic, lumbar and SIJ mobility to allow for increased lumbar AROM with less pain.  4.  Increased lumbar AROM to by 5-10 deg Rot in all planes to allow for increased ease with sit-stand transfers and functional activities    LONG TERM GOALS: Time for Goal Achievement: 8 Visits   1.  Pt. to score < 10 % on Back Index  2.  Pain level < 1/10 with all listed activities to return to normal.  3.  Lumbar AROM to WNL to allow for return to household & recreational activities w/o increased symptoms  4.  (B) LE and lower abdominal strength to 5/5 to allow for pushing, pulling and activities to occur without pain (driving, sitting, household  & Job requirements)        Plan  Therapy options: will be seen for skilled physical therapy services  Planned modality interventions: cryotherapy, electrical stimulation/Russian stimulation, TENS, thermotherapy (hydrocollator packs) and  ultrasound  Other planned modality interventions: Dry Needling  Planned therapy interventions: body mechanics training, flexibility, functional ROM exercises, home exercise program, manual therapy, neuromuscular re-education, postural training, spinal/joint mobilization, stretching, strengthening, soft tissue mobilization, abdominal trunk stabilization, balance/weight-bearing training, joint mobilization and transfer training  Frequency: 2x week  Duration in visits: 8  Treatment plan discussed with: patient      Manual Therapy:         mins  02757;  Therapeutic Exercise:    12     mins  84842;     Neuromuscular Nitish:    -    mins  92855;    Therapeutic Activity:     -     mins  80695;     Gait Training:      -     mins  52920;     Ultrasound:     -     mins  10995;    Electrical Stimulation:    -     mins  68845 ( );  Dry Needling     -     mins self-pay    Timed Treatment:   12   mins   Total Treatment:     58   mins    PT SIGNATURE:  Demetris Garland DPT, PT     SOREN Mireles License #: [id number]    DATE TREATMENT INITIATED: 7/11/2021    Initial Certification  Certification Period: 10/9/2021  I certify that the therapy services are furnished while this patient is under my care.  The services outlined above are required by this patient, and will be reviewed every 90 days.     PHYSICIAN: Chavez Yeung MD      DATE:     Please sign and return via fax to [phone].. Thank you, [facility].

## 2021-07-20 ENCOUNTER — TREATMENT (OUTPATIENT)
Dept: PHYSICAL THERAPY | Facility: CLINIC | Age: 31
End: 2021-07-20

## 2021-07-20 DIAGNOSIS — S39.012D STRAIN OF LUMBAR REGION, SUBSEQUENT ENCOUNTER: ICD-10-CM

## 2021-07-20 DIAGNOSIS — M54.50 BILATERAL LOW BACK PAIN WITHOUT SCIATICA, UNSPECIFIED CHRONICITY: Primary | ICD-10-CM

## 2021-07-20 PROCEDURE — 97140 MANUAL THERAPY 1/> REGIONS: CPT | Performed by: PHYSICAL THERAPIST

## 2021-07-20 PROCEDURE — 97110 THERAPEUTIC EXERCISES: CPT | Performed by: PHYSICAL THERAPIST

## 2021-07-20 NOTE — PROGRESS NOTES
Physical Therapy Daily Progress Note      Patient: Precious Daniels   : 1990  Diagnosis/ICD-10 Code:  Bilateral low back pain without sciatica, unspecified chronicity [M54.5]  Referring practitioner: Chavez Yeung MD  Date of Initial Visit: Type: THERAPY  Noted: 2021  Today's Date: 2021  Patient seen for 2 sessions    Subjective : Precious Daniels reports: I was on vacation for several of the days I was gone. I was active and had very little prolonged sitting.     Objective:   See Exercise, Manual, and Modality Logs for complete treatment.     Assessment/Plan:Pt tolerated treatment well over all and was able to progress into stability training for trunk and prolonged sitting and manual intervention.      Progress per Plan of Care and Progress strengthening /stabilization /functional activity       Manual Therapy:    12     mins  57266;  Therapeutic Exercise:    38     mins  27772;     Neuromuscular Nitish:    -    mins  38230;    Therapeutic Activity:     -     mins  21280;     Gait Training:      -     mins  82636;     Ultrasound:     -     mins  58691;    Electrical Stimulation:    -     mins  74243 ( );  Dry Needling     -     mins self-pay    Timed Treatment:   50   mins   Total Treatment:     58   mins      SOREN Mireles License #[id number]    Physical Therapist

## 2021-07-22 ENCOUNTER — TREATMENT (OUTPATIENT)
Dept: PHYSICAL THERAPY | Facility: CLINIC | Age: 31
End: 2021-07-22

## 2021-07-22 DIAGNOSIS — S39.012D STRAIN OF LUMBAR REGION, SUBSEQUENT ENCOUNTER: ICD-10-CM

## 2021-07-22 DIAGNOSIS — M54.50 BILATERAL LOW BACK PAIN WITHOUT SCIATICA, UNSPECIFIED CHRONICITY: Primary | ICD-10-CM

## 2021-07-22 PROCEDURE — 97110 THERAPEUTIC EXERCISES: CPT | Performed by: PHYSICAL THERAPIST

## 2021-07-22 PROCEDURE — 97530 THERAPEUTIC ACTIVITIES: CPT | Performed by: PHYSICAL THERAPIST

## 2021-07-22 NOTE — PROGRESS NOTES
Physical Therapy Daily Progress Note  Visit # 3           Patient: Precious Daniels   : 1990  Diagnosis/ICD-10 Code:  Bilateral low back pain without sciatica, unspecified chronicity [M54.5]  Referring practitioner: Chavez Yeung MD  Date of Initial Evaluation:  Type: THERAPY  Noted: 2021      Subjective  Precious Daniels reports:   I'm feeling pretty good today, I haven't had any pain over the past two days that I remember.        Objective   See Exercise, Manual, and Modality Logs for complete treatment.     Reviewed current HEP, progressed therex program with exercises as noted, added Pallof press (anti-rotation) to program.        Assessment & Plan     Assessment  Assessment details:   Tolerated continued progression of therapeutic exercise/therapeutic activity well today with continued focus on core stability.  No increased pain reported during or after exercises.  Would continue to benefit from skilled PT progressing with functional WB and strength.              Progress per Plan of Care and Progress strengthening /stabilization /functional activity           Timed:         Manual Therapy:         mins  26678     Therapeutic Exercise:     27    mins  65345     Neuromuscular Nitish:        mins  33148    Therapeutic Activity:      15    mins  91613     Gait Training:           mins  95024     Ultrasound:          mins  29783    Ionto                                   mins  20496  Self Care                            mins  49557    Un-Timed:  Electrical Stimulation:         mins 14833 ( )  Traction          mins 58700    Timed Treatment:   42   mins   Total Treatment:     52   mins    ZAINA Jimenez License #[id number]  Physical Therapist Assistant

## 2021-07-26 ENCOUNTER — TREATMENT (OUTPATIENT)
Dept: PHYSICAL THERAPY | Facility: CLINIC | Age: 31
End: 2021-07-26

## 2021-07-26 DIAGNOSIS — S39.012D STRAIN OF LUMBAR REGION, SUBSEQUENT ENCOUNTER: ICD-10-CM

## 2021-07-26 DIAGNOSIS — M54.50 BILATERAL LOW BACK PAIN WITHOUT SCIATICA, UNSPECIFIED CHRONICITY: Primary | ICD-10-CM

## 2021-07-26 PROCEDURE — 97110 THERAPEUTIC EXERCISES: CPT | Performed by: PHYSICAL THERAPIST

## 2021-07-26 PROCEDURE — 97112 NEUROMUSCULAR REEDUCATION: CPT | Performed by: PHYSICAL THERAPIST

## 2021-07-26 NOTE — PROGRESS NOTES
Physical Therapy Daily Progress Note      Patient: Precious Daniels   : 1990  Diagnosis/ICD-10 Code:  Bilateral low back pain without sciatica, unspecified chronicity [M54.5]  Referring practitioner: Chavez Yeung MD  Date of Initial Visit: Type: THERAPY  Noted: 2021  Today's Date: 2021  Patient seen for 4 sessions    Subjective : Precious Daniels reports: Doing pretty good overall and making progress.      Objective:   See Exercise, Manual, and Modality Logs for complete treatment.     Assessment/Plan:Pt tolerated treatment well overall and continue to benefit from TE and core strengthening activity.      Progress per Plan of Care and Progress strengthening /stabilization /functional activity     Manual Therapy:    -     mins  94393;  Therapeutic Exercise:    45     mins  23531;     Neuromuscular Nitish:    8    mins  37490;    Therapeutic Activity:     -     mins  68416;     Gait Training:      -     mins  38738;     Ultrasound:     -     mins  63637;    Electrical Stimulation:    -     mins  61152 ( );  Dry Needling     -     mins self-pay    Timed Treatment:   53   mins   Total Treatment:     58   mins      SOREN Mireles License #[id number]    Physical Therapist

## 2021-07-28 ENCOUNTER — TREATMENT (OUTPATIENT)
Dept: PHYSICAL THERAPY | Facility: CLINIC | Age: 31
End: 2021-07-28

## 2021-07-28 DIAGNOSIS — S39.012D STRAIN OF LUMBAR REGION, SUBSEQUENT ENCOUNTER: ICD-10-CM

## 2021-07-28 DIAGNOSIS — M54.50 BILATERAL LOW BACK PAIN WITHOUT SCIATICA, UNSPECIFIED CHRONICITY: Primary | ICD-10-CM

## 2021-07-28 PROCEDURE — 97110 THERAPEUTIC EXERCISES: CPT | Performed by: PHYSICAL THERAPIST

## 2021-07-28 PROCEDURE — 97140 MANUAL THERAPY 1/> REGIONS: CPT | Performed by: PHYSICAL THERAPIST

## 2021-07-28 NOTE — PROGRESS NOTES
Physical Therapy Daily Progress Note      Patient: Precious Daniels   : 1990  Diagnosis/ICD-10 Code:  Bilateral low back pain without sciatica, unspecified chronicity [M54.5]  Referring practitioner: Chavez Yeung MD  Date of Initial Visit: Type: THERAPY  Noted: 2021  Today's Date: 2021  Patient seen for 5 sessions    Subjective : Precious Daniels reports:  I'm doing pretty well overall, I still have some tightness in the back but the pain is overall better.    Pain: 1.5-2/10 at worst in last couple of days.  Mostly in morning.    Objective:   Active Range of Motion (CURRENT)     Lumbar   Flexion: 90 degrees (105 deg)    Extension: 20 degrees (32 deg)   Left lateral flexion: 30 degrees (40 deg)  Right lateral flexion: 35 degrees (40 deg)   Left rotation: 48 degrees (52 deg)   Right rotation: 52 degrees ( 56 deg )     Left Hip   Flexion: 125 degrees (140 deg)   Internal rotation (90/90): 20 degrees (25 deg)      Right Hip   Flexion: 140 degrees   Internal rotation (90/90): 30 degrees (35 degrees)    See Exercise, Manual, and Modality Logs for complete treatment.     Assessment/Plan:Based on ongoing trunk mobility limitations,TE has been progressed and manual intervention to the trunk was applied to observe improvements in over all mobility.  She tolerated treatment well.      Progress per Plan of Care and Progress strengthening /stabilization /functional activity     Manual Therapy:    15     mins  87174;  Therapeutic Exercise:    40     mins  07253;     Neuromuscular Nitish:    -    mins  53081;    Therapeutic Activity:     -     mins  34480;     Gait Training:      -     mins  83565;     Ultrasound:     -     mins  05242;    Electrical Stimulation:    -     mins  16746 ( );  Dry Needling     -     mins self-pay    Timed Treatment:   55   mins   Total Treatment:     60   mins      SOREN Mireles License #[id number]    Physical Therapist

## 2021-08-17 ENCOUNTER — TREATMENT (OUTPATIENT)
Dept: PHYSICAL THERAPY | Facility: CLINIC | Age: 31
End: 2021-08-17

## 2021-08-17 DIAGNOSIS — M54.50 BILATERAL LOW BACK PAIN WITHOUT SCIATICA, UNSPECIFIED CHRONICITY: Primary | ICD-10-CM

## 2021-08-17 DIAGNOSIS — S39.012D STRAIN OF LUMBAR REGION, SUBSEQUENT ENCOUNTER: ICD-10-CM

## 2021-08-17 PROCEDURE — 97110 THERAPEUTIC EXERCISES: CPT | Performed by: PHYSICAL THERAPIST

## 2021-08-17 PROCEDURE — 97140 MANUAL THERAPY 1/> REGIONS: CPT | Performed by: PHYSICAL THERAPIST

## 2021-08-17 NOTE — PROGRESS NOTES
Physical Therapy Daily Progress Note      Patient: Precious Daniels   : 1990  Diagnosis/ICD-10 Code:  Bilateral low back pain without sciatica, unspecified chronicity [M54.5]  Referring practitioner: Chavez Yeung MD  Date of Initial Visit: Type: THERAPY  Noted: 2021  Today's Date: 2021  Patient seen for 6 sessions    Subjective : Precious Daniels reports: I feel pretty good overall.  I had to do some organizing yesterday and I have some back to school activities to attend over the next week.      Objective:   See Exercise, Manual, and Modality Logs for complete treatment.     Assessment/Plan:Pt tolerated treatment well.  Of note, some R anterior later hip clicking noted /reported with extension of R hip from flexed position.  Suspect R TFL and proximal quad mm insertion tightness.    She reported less symptoms after manual intervention and stretching.      Progress per Plan of Care and Progress strengthening /stabilization /functional activity     Manual Therapy:    10     mins  14934;  Therapeutic Exercise:    40     mins  39777;     Neuromuscular Nitish:    -    mins  77863;    Therapeutic Activity:     -     mins  66285;     Gait Training:      -     mins  13174;     Ultrasound:     -     mins  12490;    Electrical Stimulation:    -     mins  36104 ( );  Dry Needling     -     mins self-pay    Timed Treatment:   50   mins   Total Treatment:     55   mins      SOREN Mireles License #[id number]    Physical Therapist

## 2021-08-20 ENCOUNTER — TELEPHONE (OUTPATIENT)
Dept: PHYSICAL THERAPY | Facility: CLINIC | Age: 31
End: 2021-08-20

## 2021-08-20 NOTE — TELEPHONE ENCOUNTER
PATIENT IS NOT GOING TO MAKE TREATMENT, IS NOT FEELING WELL AND IS GOING TO GET TESTED. RESCHEDULED FOR 09/08 AND IS ON WAIT LIST. PATIENT VERBALIZED THIS WAS SUPPOSED TO BE HER LAST TREATMENT

## 2021-09-08 ENCOUNTER — TREATMENT (OUTPATIENT)
Dept: PHYSICAL THERAPY | Facility: CLINIC | Age: 31
End: 2021-09-08

## 2021-09-08 DIAGNOSIS — S39.012D STRAIN OF LUMBAR REGION, SUBSEQUENT ENCOUNTER: Primary | ICD-10-CM

## 2021-09-08 DIAGNOSIS — M54.50 BILATERAL LOW BACK PAIN WITHOUT SCIATICA, UNSPECIFIED CHRONICITY: ICD-10-CM

## 2021-09-08 PROCEDURE — 97140 MANUAL THERAPY 1/> REGIONS: CPT | Performed by: PHYSICAL THERAPIST

## 2021-09-08 PROCEDURE — 97530 THERAPEUTIC ACTIVITIES: CPT | Performed by: PHYSICAL THERAPIST

## 2021-09-08 PROCEDURE — 97110 THERAPEUTIC EXERCISES: CPT | Performed by: PHYSICAL THERAPIST

## 2021-09-08 NOTE — PROGRESS NOTES
Physical Therapy Discharge Note      Patient: Precious Daniels   : 1990  Diagnosis/ICD-10 Code:  Strain of lumbar region, subsequent encounter [S39.012D]  Referring practitioner: Chavez Yeung MD  Date of Initial Visit: Type: THERAPY  Noted: 2021  Today's Date: 2021  Patient seen for 7 sessions    Subjective : Precious Daniels reports: Overall, I feel pretty good.  I still feel like my trunk is limited the most in rotation about mid level.  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      Objective:   Active Range of Motion (CURRENT)     Lumbar   Flexion: 90 degrees (105 deg)    Extension: 20 degrees (32 deg)   Left lateral flexion: 30 degrees (40 deg)  Right lateral flexion: 35 degrees (40 deg)   Left rotation: 48 degrees (52 deg progress note), (65 deg)   Right rotation: 52 degrees ( 56 deg progress note) 65 deg following thoracic joint mobilization,       Left Hip   Flexion: 125 degrees (140 deg)   Internal rotation (90/90): 20 degrees (25 deg)      Right Hip   Flexion: 140 degrees   Internal rotation (90/90): 30 degrees (35 degrees)    See Exercise, Manual, and Modality Logs for complete treatment.     Assessment/Plan:Precious has done well with PT intervention and has been able to demonstrate improvement in her ROM in all planes. We discussed trying to consolidate to 1 bag to work and continued work on hip and trunk strength.  She expressed understanding and will be discharged to [facility].  Other       Manual Therapy:    8     mins  84502;  Therapeutic Exercise:    35     mins  15634;     Neuromuscular Nitish:    -    mins  02525;    Therapeutic Activity:     12     mins  75398;   Including tests and measures   Gait Training:      -     mins  96208;     Ultrasound:     -     mins  49182;    Electrical Stimulation:    -     mins  56477 ( );  Dry Needling     -     mins  self-pay    Timed Treatment:   55   mins   Total Treatment:     65   mins      Demetris Garland, PT  [place] License #[id number]    Physical Therapist

## 2022-03-04 DIAGNOSIS — Z15.09 BRCA1 GENE MUTATION POSITIVE IN FEMALE: Primary | ICD-10-CM

## 2022-03-04 DIAGNOSIS — Z15.02 BRCA1 GENE MUTATION POSITIVE IN FEMALE: Primary | ICD-10-CM

## 2022-03-04 DIAGNOSIS — Z15.01 BRCA1 GENE MUTATION POSITIVE IN FEMALE: Primary | ICD-10-CM

## 2022-03-07 ENCOUNTER — TELEPHONE (OUTPATIENT)
Dept: SURGERY | Facility: CLINIC | Age: 32
End: 2022-03-07

## 2022-03-07 NOTE — TELEPHONE ENCOUNTER
Deny breast mri [facility]   Tuesday 3/22/22 8:30.     NP to see Dr. Lima 6/3/22 9:00 arrival.    I had to leave patient a message to call us back.

## 2022-03-09 DIAGNOSIS — Z15.01 BRCA1 GENE MUTATION POSITIVE: Primary | ICD-10-CM

## 2022-03-09 DIAGNOSIS — Z15.09 BRCA1 GENE MUTATION POSITIVE: Primary | ICD-10-CM

## 2022-03-10 ENCOUNTER — TELEPHONE (OUTPATIENT)
Dept: SURGERY | Facility: CLINIC | Age: 32
End: 2022-03-10

## 2022-03-10 NOTE — TELEPHONE ENCOUNTER
Pt notified of the following appts:    Breast MRI is scheduled at the mobile unit on 03/25/2022- originally this was set for 03/23/2022 but the pt had a conflict that day and moved this appt herself.     New pt appt with Dr. Lmia will be on 04/25/2022 at 9:30.  New pt paperwork was mailed with an appt reminder.     CMA

## 2022-03-22 ENCOUNTER — APPOINTMENT (OUTPATIENT)
Dept: MRI IMAGING | Facility: HOSPITAL | Age: 32
End: 2022-03-22

## 2022-03-22 ENCOUNTER — TELEPHONE (OUTPATIENT)
Dept: SURGERY | Facility: CLINIC | Age: 32
End: 2022-03-22

## 2022-03-22 NOTE — TELEPHONE ENCOUNTER
Due to scheduling conflict, new patient appointment with Dr. Lima Rescheduled from 4/25/2022 @ 9:30am to 5/9/2022 @ 12:30pm, arrive @ 12:00pm    Called Pt and LM for her to call back to confirm new appointment.

## 2022-03-25 ENCOUNTER — APPOINTMENT (OUTPATIENT)
Dept: OTHER | Facility: HOSPITAL | Age: 32
End: 2022-03-25

## 2022-03-25 ENCOUNTER — HOSPITAL ENCOUNTER (OUTPATIENT)
Dept: MRI IMAGING | Facility: HOSPITAL | Age: 32
Discharge: HOME | End: 2022-03-25

## 2022-03-25 DIAGNOSIS — Z09 FOLLOW UP: ICD-10-CM

## 2022-03-25 DIAGNOSIS — Z15.02 BRCA1 GENE MUTATION POSITIVE IN FEMALE: ICD-10-CM

## 2022-03-25 DIAGNOSIS — Z15.01 BRCA1 GENE MUTATION POSITIVE IN FEMALE: ICD-10-CM

## 2022-03-25 DIAGNOSIS — Z15.09 BRCA1 GENE MUTATION POSITIVE IN FEMALE: ICD-10-CM

## 2022-03-25 PROCEDURE — 77049 MRI BREAST C-+ W/CAD BI: CPT

## 2022-03-25 PROCEDURE — A9577 INJ MULTIHANCE: HCPCS | Performed by: SURGERY

## 2022-03-25 PROCEDURE — 0 GADOBENATE DIMEGLUMINE 529 MG/ML SOLUTION: Performed by: SURGERY

## 2022-03-25 RX ADMIN — GADOBENATE DIMEGLUMINE 18 ML: 529 INJECTION, SOLUTION INTRAVENOUS at 13:21

## 2022-03-28 ENCOUNTER — TELEPHONE (OUTPATIENT)
Dept: SURGERY | Facility: CLINIC | Age: 32
End: 2022-03-28

## 2022-03-28 NOTE — TELEPHONE ENCOUNTER
Scheduled left MRI guided breast biopsy [facility]   Tuesday 4/19/22 6:30 am arrival     I had to leave patient a message to call me back.

## 2022-04-19 ENCOUNTER — HOSPITAL ENCOUNTER (OUTPATIENT)
Dept: MAMMOGRAPHY | Facility: HOSPITAL | Age: 32
Discharge: HOME | End: 2022-04-19

## 2022-04-19 ENCOUNTER — HOSPITAL ENCOUNTER (OUTPATIENT)
Dept: MRI IMAGING | Facility: HOSPITAL | Age: 32
Discharge: HOME | End: 2022-04-19

## 2022-04-19 VITALS
HEART RATE: 79 BPM | TEMPERATURE: 98 F | OXYGEN SATURATION: 99 % | HEIGHT: 66 IN | BODY MASS INDEX: 30.53 KG/M2 | SYSTOLIC BLOOD PRESSURE: 112 MMHG | DIASTOLIC BLOOD PRESSURE: 65 MMHG | RESPIRATION RATE: 16 BRPM | WEIGHT: 190 LBS

## 2022-04-19 DIAGNOSIS — Z98.890 STATUS POST BIOPSY: ICD-10-CM

## 2022-04-19 PROCEDURE — 77065 DX MAMMO INCL CAD UNI: CPT

## 2022-04-19 PROCEDURE — 88305 TISSUE EXAM BY PATHOLOGIST: CPT | Performed by: SURGERY

## 2022-04-19 PROCEDURE — A9577 INJ MULTIHANCE: HCPCS | Performed by: SURGERY

## 2022-04-19 PROCEDURE — 0 LIDOCAINE 1 % SOLUTION: Performed by: SURGERY

## 2022-04-19 PROCEDURE — 0 GADOBENATE DIMEGLUMINE 529 MG/ML SOLUTION: Performed by: SURGERY

## 2022-04-19 PROCEDURE — A4648 IMPLANTABLE TISSUE MARKER: HCPCS

## 2022-04-19 RX ORDER — LIDOCAINE HYDROCHLORIDE 10 MG/ML
1 INJECTION, SOLUTION INFILTRATION; PERINEURAL ONCE
Status: COMPLETED | OUTPATIENT
Start: 2022-04-19 | End: 2022-04-19

## 2022-04-19 RX ORDER — DIAZEPAM 5 MG/1
5 TABLET ORAL ONCE AS NEEDED
Status: DISCONTINUED | OUTPATIENT
Start: 2022-04-19 | End: 2022-04-20 | Stop reason: HOSPADM

## 2022-04-19 RX ORDER — DIAZEPAM 5 MG/1
5 TABLET ORAL ONCE AS NEEDED
Status: CANCELLED | OUTPATIENT
Start: 2022-04-19

## 2022-04-19 RX ADMIN — LIDOCAINE HYDROCHLORIDE 15 ML: 10; .005 INJECTION, SOLUTION EPIDURAL; INFILTRATION; INTRACAUDAL; PERINEURAL at 08:50

## 2022-04-19 RX ADMIN — LIDOCAINE HYDROCHLORIDE 1 ML: 10 INJECTION, SOLUTION INFILTRATION; PERINEURAL at 08:49

## 2022-04-19 RX ADMIN — GADOBENATE DIMEGLUMINE 18 ML: 529 INJECTION, SOLUTION INTRAVENOUS at 08:34

## 2022-04-19 NOTE — H&P
Name: Precious Daniels ADMIT: 2022   : 1990  PCP: Chavez Yeung MD    MRN: [id number] LOS: 0 days   AGE/SEX: 31 y.o. female  ROOM: Room/bed info not found       Chief complaint left breast abnormal enhancement    Present Illness or Internal History:  Patient is a 31 y.o. female presents with left breast abnormal enhancement.     Past Surgical History:  Past Surgical History:   Procedure Laterality Date   • CHOLECYSTECTOMY     • LAPAROSCOPIC GASTRIC BANDING         Past Medical History:  Past Medical History:   Diagnosis Date   • Allergic    • Anxiety    • BRCA1 positive 10/16/2017   • Chest pain    • Depression    • GERD (gastroesophageal reflux disease)    • Hypothyroidism    • Injury of back    • Palpitations        Home Medications:  (Not in a hospital admission)      Allergies:  Morphine, Molds & smuts, Penicillins, and Cefdinir    Family History:  Family History   Problem Relation Age of Onset   • Hypertension Mother    • Anxiety disorder Mother    • Hypertension Father    • Diabetes Father    • Breast cancer Paternal Aunt    • Hypertension Maternal Grandmother    • Breast cancer Maternal Grandmother    • Thyroid disease Maternal Grandmother    • Glaucoma Maternal Grandmother    • Heart disease Maternal Grandfather    • Hypertension Maternal Grandfather    • Diabetes Maternal Grandfather        Social History:  Social History     Tobacco Use   • Smoking status: Never Smoker   • Smokeless tobacco: Never Used   Vaping Use   • Vaping Use: Never used   Substance Use Topics   • Alcohol use: Yes     Alcohol/week: 1.0 - 2.0 standard drink     Types: 1 - 2 Glasses of wine per week   • Drug use: Never        Objective     Physical Exam:    No exam performed today,    Vital Signs  Temp:  [98 °F (36.7 °C)] 98 °F (36.7 °C)  Heart Rate:  [89] 89  Resp:  [16] 16  BP: (114)/(75) 114/75    Anticipated Surgical Procedure:  MRI guided lft breast biopsy with clip placement    The risks, benefits and  alternatives of this procedure have been discussed with the patient or responsible party: Yes        Steve Agrawal Jr., MD  04/19/22  07:30 EDT

## 2022-04-19 NOTE — NURSING NOTE
Biopsy site to left outer clear with Dermabond dry and intact. No firmness or swelling noted at or around biopsy site. Denies pain. Ice pack with protective covering applied to biopsy site. Bruising is not noted. Pt had post mammo. Dr. Agrawal stated they were good images, pt ok to leave.  Discharge instructions discussed with understanding voiced by patient. Copies provided to patient. No distress noted. To home via private vehicle

## 2022-04-20 LAB
LAB AP CASE REPORT: NORMAL
LAB AP DIAGNOSIS COMMENT: NORMAL
PATH REPORT.FINAL DX SPEC: NORMAL
PATH REPORT.GROSS SPEC: NORMAL

## 2022-04-22 ENCOUNTER — TELEPHONE (OUTPATIENT)
Dept: SURGERY | Facility: CLINIC | Age: 32
End: 2022-04-22

## 2022-04-22 NOTE — TELEPHONE ENCOUNTER
I called to let her know that her biopsy was benign. I had to leave patient a message to call us back.

## 2022-05-18 ENCOUNTER — OFFICE VISIT (OUTPATIENT)
Dept: SURGERY | Facility: CLINIC | Age: 32
End: 2022-05-18

## 2022-05-18 VITALS
SYSTOLIC BLOOD PRESSURE: 112 MMHG | DIASTOLIC BLOOD PRESSURE: 64 MMHG | OXYGEN SATURATION: 98 % | HEIGHT: 66 IN | WEIGHT: 197 LBS | BODY MASS INDEX: 31.66 KG/M2 | HEART RATE: 92 BPM

## 2022-05-18 DIAGNOSIS — Z15.02 BRCA1 GENE MUTATION POSITIVE IN FEMALE: Primary | ICD-10-CM

## 2022-05-18 DIAGNOSIS — E66.9 OBESITY (BMI 30-39.9): ICD-10-CM

## 2022-05-18 DIAGNOSIS — Z15.09 BRCA1 GENE MUTATION POSITIVE IN FEMALE: Primary | ICD-10-CM

## 2022-05-18 DIAGNOSIS — N60.12 FIBROCYSTIC DISEASE OF LEFT BREAST: ICD-10-CM

## 2022-05-18 DIAGNOSIS — Z80.3 FH: BREAST CANCER: ICD-10-CM

## 2022-05-18 DIAGNOSIS — Z15.01 BRCA1 GENE MUTATION POSITIVE IN FEMALE: Primary | ICD-10-CM

## 2022-05-18 DIAGNOSIS — Z80.41 FH: OVARIAN CANCER: ICD-10-CM

## 2022-05-18 DIAGNOSIS — R92.8 ABNORMAL MRI, BREAST: ICD-10-CM

## 2022-05-18 PROCEDURE — 99203 OFFICE O/P NEW LOW 30 MIN: CPT | Performed by: SURGERY

## 2022-05-18 RX ORDER — DIPHENOXYLATE HYDROCHLORIDE AND ATROPINE SULFATE 2.5; .025 MG/1; MG/1
TABLET ORAL
COMMUNITY
Start: 2021-01-01

## 2022-05-18 NOTE — PROGRESS NOTES
Chief Complaint: Precious Rico is a 31 y.o. female who was seen in consultation at the request of Chavez Yeung MD  for BRCA1 MUTATION    History of Present Illness:  Patient presents with management of breast cancer risk. She noted no new masses, skin changes, nipple discharge, nipple changes prior to her most recent imaging.  Her most recent imaging includes the followin/10/2019 BILATERAL SCREENING MAMMO WITH SIOBHAN   BISHOP RICO  The breasts are almost entirely fatty.  BI-RADS Category 1: Negative.    2020 BILATERAL SCREENING MAMMO WITH SIOBHAN     BISHOP RICO  The breasts are almost entirely fatty.  BI-RADS Category 1: Negative.    2021    BILATERAL BREAST MRI    [facility] JAQUELINE RICO  Right breast: No suspicious enhancing mass or nonmass enhancement is seen.  Left breast: No suspicious enhancing mass or nonmass enhancement is seen. Mildly prominent left axillary lymph nodes are noted.    2021 LEFT AXILLA US       [facility] JAQUELINE RICO  Single mildly enlarged left axillary lymph node, favored to represent reactive adenopathy due to vaccination.  BI-RADS Category 3.    2021 LEFT AXILLA US      [facility] JAQUELINE RICO  Cortical thickening in the previously identified mildly left axillary lymph node is decreased compared to the prior study. Patient will be due for this examination 2021. Patient will be due for annual high or screening breast MRI 2022.  BI-RADS Category 2.    2022   BILATERAL SCREENING MAMMO WITH SIOBHAN  ALSA ANGELLA RICO  The breasts are almost entirely fatty.  BI-RADS Category 1: Negative.    2022 BILATERAL BREAST MRI          LAURIE RICO  LEFT BREAST:  In the outer middle and anterior left breast 3.8 cm posterior to the nipple, there is approximately 6.2 cm AP dimension, 2.0 cm transverse dimension, 1.6  cm craniocaudal dimension somewhat segmental nonmass enhancement, which is new from 2/26/2021 suspicious. Terminated approximately 1.6 cm from the base of the nipple. No correlate is identified on recent mammogram. Previously noted asymmetrically prominent left axillary lymph nodes have decreased in size, including a reference 1.4 cm left axillary lymph node, which previously measured 2.0 cm when remeasured.  1. Suspicious new outer left breast nonmass enhancement. Recommend further evaluation with MRI guided core needle biopsy.  2. No MRI evidence of malignancy in the right breast.      She had a biopsy on the following day that showed:   04/19/2022 MRI BREAST BIOPSY         [facility]          MAXINE RICO  Left breast at the 3 o’clock. 11 gauge Mammotome. Multiple tissue specimens. A bowtie-shaped metallic clip was placed. Postbiopsy mammography demonstrates placement of a metallic clip at the 3 o’clock position. Pathology is concordant.  PATHOLOGY:  1. Left Breast, 3 o’clock, MRI guided biopsies for segmental non-mass enhancement:  A. Benign breast parenchyma with focal fibroadenomatoid change and pseudoangiomatous stromal hyperplasia (PASH).  B. No atypical hyperplasia, in situ or invasive carcinoma identified      She has not had a breast biopsy in the past.  She has her uterus and ovaries, is premenopausal, and takes nor hormones.  Her family history includes the following:   She is nulliparous and in a marriage with her wife Roslyn Rico.  She has 1 sister, 2 maternal aunts.  She has 1 of 2 maternal aunts who had breast cancer.  Her maternal grandmother had breast cancer.  Her maternal grandmother also had ovarian cancer.  She is here for evaluation.    Review of Systems:  Review of Systems   Neurological: Positive for headaches.   All other systems reviewed and are negative.       Past Medical and Surgical History:  Breast Biopsy History:  Patient has had the following breast biopsies:4/19/22 LEFT MRI BIOPSY  BENIGN   Breast Cancer HIstory:  Patient does not have a past medical history of breast cancer.  Breast Operations, and year:  NONE   Obstetric/Gynecologic History:  Age menstrual periods began: 12  Patient is premenopausal, first day of last period: 4/2022 APPROX  Number of pregnancies: 0  Number of live births: 0  Number of abortions or miscarriages: 0  Age of delivery of first child: N/A   BREAST FEEDING: N/A   Length of time taking birth control pills: 2 YRS   PATIENT TOOK HORMONES FOR A SHORT PERIOD OF TIME LAST YEAR. THIS WAS REGARDING FERTILITY.     PATIENT HAS UTERUS OVARIES.   Past Surgical History:   Procedure Laterality Date   • CHOLECYSTECTOMY     • LAPAROSCOPIC GASTRIC BANDING       Past Medical History:   Diagnosis Date   • Allergic    • Anxiety    • BRCA1 positive 10/16/2017   • Chest pain    • Depression    • GERD (gastroesophageal reflux disease)    • Hypothyroidism    • Injury of back    • Palpitations        Prior Hospitalizations, other than for surgery or childbirth, and year:  NONE     Social History     Socioeconomic History   • Marital status:    Tobacco Use   • Smoking status: Never Smoker   • Smokeless tobacco: Never Used   Vaping Use   • Vaping Use: Never used   Substance and Sexual Activity   • Alcohol use: Yes     Alcohol/week: 1.0 - 2.0 standard drink     Types: 1 - 2 Glasses of wine per week   • Drug use: Never   • Sexual activity: Defer     Patient is .  Patient is employed full time with the following occupation: ADVISOR  Patient drinks 16 OZ.  servings of caffeine per day.    Family History:  Family History   Problem Relation Age of Onset   • Hypertension Mother    • Anxiety disorder Mother    • Hypertension Father    • Diabetes Father    • Breast cancer Maternal Aunt 26   • Hypertension Maternal Grandmother    • Breast cancer Maternal Grandmother 50   • Thyroid disease Maternal Grandmother    • Glaucoma Maternal Grandmother    • Ovarian cancer Maternal Grandmother 70  "  • Heart disease Maternal Grandfather    • Hypertension Maternal Grandfather        Vital Signs:  /64   Pulse 92   Ht 167.6 cm (66\")   Wt 89.4 kg (197 lb)   LMP 2022 (Approximate)   SpO2 98%   Breastfeeding No   BMI 31.80 kg/m²      Medications:    Current Outpatient Medications:   •  Ascorbic Acid (VITAMIN C PO), Take  by mouth., Disp: , Rfl:   •  calcium carbonate EX (TUMS EX) 750 MG chewable tablet, Chew 750 mg Daily., Disp: , Rfl:   •  cetirizine-pseudoephedrine (ZyrTEC-D Allergy & Congestion) 5-120 MG per 12 hr tablet, Take 1 tablet by mouth 2 (Two) Times a Day., Disp: 180 tablet, Rfl: 0  •  multivitamin (THERAGRAN) tablet tablet, , Disp: , Rfl:   •  nitrofurantoin, macrocrystal-monohydrate, (Macrobid) 100 MG capsule, Take 1 capsule by mouth 2 (Two) Times a Day., Disp: 10 capsule, Rfl: 0  •  Prenatal Multivit-Min-Fe-FA (Pre-Iris Formula) tablet, , Disp: , Rfl:      Allergies:  Allergies   Allergen Reactions   • Morphine Other (See Comments)     Makes patient agitated, and angry  Makes patient agitated, and angry    Makes patient agitated, and angry  Makes patient agitated, and angry  Makes patient agitated, and angry  Makes patient agitated, and angry  Makes patient agitated, and angry  Makes patient agitated, and angry  Makes patient agitated, and angry  Makes patient agitated, and angry  Makes patient agitated, and angry  Makes patient agitated, and angry  Makes patient agitated, and angry     • Molds & Smuts    • Penicillins Hives, Other (See Comments) and Unknown (See Comments)     Pt states unsure of reaction     • Cefdinir Diarrhea       Physical Examination:  /64   Pulse 92   Ht 167.6 cm (66\")   Wt 89.4 kg (197 lb)   LMP 2022 (Approximate)   SpO2 98%   Breastfeeding No   BMI 31.80 kg/m²   General Appearance:  Patient is in no distress.  She is well kept and has an obese build.   Psychiatric:  Patient with appropriate mood and affect. Alert and oriented to self, " time, and place.    Breast, RIGHT:  medium sized, 40B, ptotic with stretch marks from weight loss, symmetric with the contralateral side.  Breast skin is without erythema, edema, rashes.  There are no visible abnormalities upon inspection during the arm-raising maneuver or with hands on hips in the sitting position. There is no nipple retraction, discharge or nipple/areolar skin changes.There are no masses palpable in the sitting or supine positions.    Breast, LEFT:  medium sized, 40B, ptotic, stretch marks from weight loss, symmetric with the contralateral side.  Breast skin is without erythema, edema, rashes.  There are no visible abnormalities upon inspection during the arm-raising maneuver or with hands on hips in the sitting position. There is no nipple retraction, discharge or nipple/areolar skin changes.There are no masses palpable in the sitting or supine positions.    Lymphatic:  There is no axillary, cervical, infraclavicular, or supraclavicular adenopathy bilaterally.  Eyes:  Pupils are round and reactive to light.  Cardiovascular:  Heart rate and rhythm are regular.  Respiratory:  Lungs are clear bilaterally with no crackles or wheezes in any lung field.  Gastrointestinal:  Abdomen is soft, nondistended, and nontender.     Musculoskeletal:  Good strength in all 4 extremities.   There is good range of motion in both shoulders.    Skin:  No new skin lesions or rashes on the skin excluding the breast (see breast exam above).        Imagin2018   BILATERAL SCREENING MAMMO WITH [facility]   MAXINE RICO  Scattered areas of fibroglandular density.  BI-RADS Category 1: Negative.    05/10/2019 BILATERAL SCREENING MAMMO WITH [facility]    MAXINE RICO  The breasts are almost entirely fatty.  BI-RADS Category 1: Negative.    2020 BILATERAL SCREENING MAMMO WITH [facility]    MAXINE RICO  The breasts are almost entirely fatty.  BI-RADS Category 1:  Negative.    02/26/2021    [facility]  Right breast: No suspicious enhancing mass or nonmass enhancement is seen.  Left breast: No suspicious enhancing mass or nonmass enhancement is seen. Mildly prominent left axillary lymph nodes are noted.    03[facility]03[facility]2021 [facility]  Single mildly enlarged left axillary lymph node, favored to represent reactive adenopathy due to vaccination.  BI-RADS Category 3.    04/08/2021 [facility]  Cortical thickening in the previously identified mildly left axillary lymph node is decreased compared to the prior study. Patient will be due for this examination December 2021. Patient will be due for annual high or screening breast MRI February 2022.  BI-RADS Category 2.    01/06/2022   BILATERAL SCREENING MAMMO WITH SIOBHAN  [facility]   MAXINE RICO  The breasts are almost entirely fatty.  BI-RADS Category 1: Negative.    03/25/2022 BILATERAL BREAST MRI          [facility]       MAXINE RICO  LEFT BREAST:  In the outer middle and anterior left breast 3.8 cm posterior to the nipple, there is approximately 6.2 cm AP dimension, 2.0 cm transverse dimension, 1.6 cm craniocaudal dimension somewhat segmental nonmass enhancement, which is new from 2/26/2021 suspicious. Terminated approximately 1.6 cm from the base of the nipple. No correlate is identified on recent mammogram. Previously noted asymmetrically prominent left axillary lymph nodes have decreased in size, including a reference 1.4 cm left axillary lymph node, which previously measured 2.0 cm when remeasured.  1. Suspicious new outer left breast nonmass enhancement. Recommend further evaluation with MRI guided core needle biopsy.  2. No MRI evidence of malignancy in the right breast.        Pathology:  04/19/2022 MRI BREAST BIOPSY         [facility]  TRISHA  Left breast at the 3 o’clock. 11 gauge Mammotome. Multiple tissue specimens. A bowtie-shaped metallic clip was placed. Postbiopsy mammography demonstrates placement of a metallic clip at the 3 o’clock position. Pathology is concordant.  PATHOLOGY:  1. Left Breast, 3 o’clock, MRI guided biopsies for segmental non-mass enhancement:  A. Benign breast parenchyma with focal fibroadenomatoid change and pseudoangiomatous stromal hyperplasia (PASH).  B. No atypical hyperplasia, in situ or invasive carcinoma identified    LABS:   08/10/2006 [facility] OSMANY      MAXINE LEWISAWAY  3604delA BRCA1 with a result of 3604delA. Deleterious mutations in BRCA1 may confer as much as an 87% risk of breast cancer and a 44% risk of ovarian cancer by age 70.              PROGRESS NOTES      MAXINE TRISHA  Consider delaying oophorectomy until 38 if desired. Discussed with Gyn Onc - refer to Dr Hannah for ovarian cancer screening. Since no family history of pancreatic cancer, I do not recommend routine screening at this time. If she still has her breasts at age 33, then we can consider Tamoxifen.    02/21/2022 PROGRESS NOTES      DR OSMIN RICO  BRCA1 positive. Suggested rrBSO at 33 though she might like to wait to 38. Hysterectomy not likely to reduce risk significantly. A reasonable strategy would be yearly exam, , and ultrasound.          Procedures:      Assessment:   Diagnosis Plan   1. BRCA1 gene mutation positive in female  Ambulatory Referral to Genetics   2. Abnormal MRI, breast     3. Fibrocystic disease of left breast     4. FH: breast cancer     5. FH: ovarian cancer     6. Obesity (BMI 30-39.9)     1-  08/10/2006 [facility] OSMANY RICO  3604delA BRCA1 with a result of 3604delA. Deleterious mutations in BRCA1 may confer as much as an 87% risk of breast cancer and a 44% risk of ovarian cancer by age 70.    - Sees Dr Melendez and Dr Rose- plan for RRSO age 38 ana        2-3  Left  breast 3:00 middle and anterior one third, 3.8 cm from the nipple, 6.2 cm of enhancement that terminates 1.6 cm in the nipple areolar complex.  No findings in the right breast.  MRI guided biopsy left breast April 19, 2022 bowtie shaped marker which is the only one in the breast returned as fibroadenomatoid change, PASH and was felt to be concordant.      4-  1 of 3 MA age 50s  MG age 24      5-  MGM age 68    6-  BMI 31- Post lap band remotely, after which she lost 70-80#      Plan:  The patient goes by Precious.  She is here with her wife Roslyn Daniels. Roslyn's mother is a patient of ours Edie Daniels, who underwent mastectomy no reconstruction. Reportedly doing well according to Roslyn.    We reviewed the normal function of the BRCA 1 and 2 proteins, namely DNA repair.     We discussed the implications of a positive result in one of the BRCA genes: namely a 60-80% lifetime risk of breast cancer by age 70, and a 40-60% lifetime risk of ovarian cancer by the same age, as well as other less common cancers.  We discussed the interventions for a positive result, including 1- increased surveillance- with MRI annually in addition to mammography/monthly SBE/annual or biannual CBE/annual TVU and testing for CA-125, 2- risk reduction in the form of hormonal blockade (tamoxifen, raloxifene, oopherectomy) or OCPs, and 3- bilateral risk reducing mastectomy with bilateral salpingo-oopherectomy at a later time.     We discussed the recommendation for any first degree relative to be tested in the presence of a positive mutation, since there is a 50% chance of those individuals being affected.       She saw a genetic counselor back in 2006 when she was a teenager but would be interested in talking with them again so we will arrange that for her.    She is certain that she would like to proceed with a surgical risk reduction.  She knows that this is a 90-95 not a 100% risk reduction.  She has seen members of her family undergo risk  reducing surgery and also seen members of her family die from breast cancer.      We discussed today that she is interested in either a mastopexy with delayed nipple sparing reconstruction versus a total mastectomy with 3D nipple tattooing.        She has seen Albert Freitas and Chuck Mike.  She also saw some surgeons up in [facility].  She is seeing Nisreen Barry but has had a hard time getting back in her office.      She is going to consider the following: Whether or not she wants to have 2 procedures versus 1, the importance of her nipple to her, her choice of surgeon and plastic surgeon.      -We discussed frozen section behind nipple if NSM chosen after mastopexy. We discussed the risks of bleeding, infection, skin loss.  We discussed possibility of nipple loss with nipple sparing mastectomy.  Discussed an overnight hospital stay.      She will call and let us know her choices.  I have asked her to take her time and we will be here to help her when she is ready.    Her next mammogram would be due January 7, 2023 at [place].  Her next MRI would be due February 28, 2023.          Kristin Lima MD        We have spent 60 minutes in face to face visit today, 45 in face to face .      Next Appointment:  Return preop visit if patient seeks care with us.      EMR Dragon/transcription disclaimer:    Much of this encounter note is an electronic transcription/translocation of spoken language to printed text.  The electronic translation of spoken language may permit erroneous, or at times, nonsensical words or phrases to be inadvertently transcribed.  Although I have reviewed the note from such areas, some may still exist.

## 2022-05-19 ENCOUNTER — TELEPHONE (OUTPATIENT)
Dept: SURGERY | Facility: CLINIC | Age: 32
End: 2022-05-19

## 2022-05-19 NOTE — TELEPHONE ENCOUNTER
MMG scheduled @ [facility] (Suhas Dietrich) on 1/11/2023 @ 1:30pm, arrive @ 1:15pm. Per Uma @ [facility] Scheduling    MRI scheduled @ [facility] ([facility]) on 2/28/2023 @ 1:00pm, arrive @ 12:30pm.    Called Pt. Patient expressed v/u of appointments.    She stated she may have surgery this year (Total Mastectomy) she has not decided when or who she wants to do the surgery. I provided our office phone number to her if she decides on Dr. Lima to do surgery.

## 2022-06-02 ENCOUNTER — OFFICE VISIT (OUTPATIENT)
Dept: FAMILY MEDICINE CLINIC | Facility: CLINIC | Age: 32
End: 2022-06-02

## 2022-06-02 VITALS
BODY MASS INDEX: 31.02 KG/M2 | OXYGEN SATURATION: 99 % | RESPIRATION RATE: 18 BRPM | SYSTOLIC BLOOD PRESSURE: 122 MMHG | TEMPERATURE: 97.8 F | WEIGHT: 193 LBS | DIASTOLIC BLOOD PRESSURE: 68 MMHG | HEIGHT: 66 IN | HEART RATE: 99 BPM

## 2022-06-02 DIAGNOSIS — J30.1 SEASONAL ALLERGIC RHINITIS DUE TO POLLEN: Primary | ICD-10-CM

## 2022-06-02 PROCEDURE — 99213 OFFICE O/P EST LOW 20 MIN: CPT | Performed by: INTERNAL MEDICINE

## 2022-06-02 RX ORDER — CETIRIZINE HYDROCHLORIDE, PSEUDOEPHEDRINE HYDROCHLORIDE 5; 120 MG/1; MG/1
1 TABLET, FILM COATED, EXTENDED RELEASE ORAL 2 TIMES DAILY
Qty: 180 TABLET | Refills: 0 | Status: SHIPPED | OUTPATIENT
Start: 2022-06-02 | End: 2023-02-24 | Stop reason: SDUPTHER

## 2022-06-05 NOTE — PROGRESS NOTES
Subjective   Precious Daniels is a 31 y.o. female. Patient is here today for   Chief Complaint   Patient presents with   • Follow-up          Vitals:    06/02/22 1559   BP: 122/68   Pulse: 99   Resp: 18   Temp: 97.8 °F (36.6 °C)   SpO2: 99%     Body mass index is 31.17 kg/m².      Past Medical History:   Diagnosis Date   • Allergic    • Anxiety    • BRCA1 positive 10/16/2017   • Chest pain    • Depression    • GERD (gastroesophageal reflux disease)    • Hypothyroidism    • Injury of back    • Palpitations       Allergies   Allergen Reactions   • Morphine Other (See Comments)     Makes patient agitated, and angry  Makes patient agitated, and angry    Makes patient agitated, and angry  Makes patient agitated, and angry  Makes patient agitated, and angry  Makes patient agitated, and angry  Makes patient agitated, and angry  Makes patient agitated, and angry  Makes patient agitated, and angry  Makes patient agitated, and angry  Makes patient agitated, and angry  Makes patient agitated, and angry  Makes patient agitated, and angry     • Molds & Smuts    • Penicillins Hives, Other (See Comments) and Unknown (See Comments)     Pt states unsure of reaction     • Cefdinir Diarrhea      Social History     Socioeconomic History   • Marital status:    Tobacco Use   • Smoking status: Never Smoker   • Smokeless tobacco: Never Used   Vaping Use   • Vaping Use: Never used   Substance and Sexual Activity   • Alcohol use: Yes     Alcohol/week: 1.0 - 2.0 standard drink     Types: 1 - 2 Glasses of wine per week   • Drug use: Never   • Sexual activity: Defer        Current Outpatient Medications:   •  Ascorbic Acid (VITAMIN C PO), Take  by mouth., Disp: , Rfl:   •  calcium carbonate EX (TUMS EX) 750 MG chewable tablet, Chew 750 mg Daily., Disp: , Rfl:   •  cetirizine-pseudoephedrine (ZyrTEC-D Allergy & Congestion) 5-120 MG per 12 hr tablet, Take 1 tablet by mouth 2 (Two) Times a Day., Disp: 180 tablet, Rfl: 0  •  multivitamin  (THERAGRAN) tablet tablet, , Disp: , Rfl:      Objective     She is going to be undergoing a bilateral mastopexy in a couple weeks.  She has had a work-up recently which shows that she is a greatly increased risk for breast malignancies.    She and her wife were planning on having a child sometime soon but that issue has been complicated recently.  It may be put off for a while.    She has seasonal allergies.  She finds Zyrtec-D to be effective and she asked me to send a prescription in for her.           Review of Systems   Constitutional: Negative.    HENT: Negative.    Respiratory: Negative.    Cardiovascular: Negative.    Musculoskeletal: Negative.    Psychiatric/Behavioral: Negative.        Physical Exam  Vitals and nursing note reviewed.   Constitutional:       Appearance: Normal appearance. She is obese.      Comments: Pleasant, neatly groomed, no distress.   Cardiovascular:      Rate and Rhythm: Regular rhythm.      Heart sounds: Normal heart sounds.   Pulmonary:      Breath sounds: Normal breath sounds.   Neurological:      Mental Status: She is alert and oriented to person, place, and time.   Psychiatric:         Mood and Affect: Mood normal.         Behavior: Behavior normal.           Problems Addressed this Visit        Allergies and Adverse Reactions    Seasonal allergic rhinitis - Primary      Diagnoses       Codes Comments    Seasonal allergic rhinitis due to pollen    -  Primary ICD-10-CM: J30.1  ICD-9-CM: 477.0             PLAN  I refilled her Zyrtec-D.    I like her to follow-up to see me for an annual physical exam sometime this year.  No follow-ups on file.

## 2022-06-09 ENCOUNTER — TELEPHONE (OUTPATIENT)
Dept: FAMILY MEDICINE CLINIC | Facility: CLINIC | Age: 32
End: 2022-06-09

## 2022-06-09 RX ORDER — BENZONATATE 100 MG/1
100 CAPSULE ORAL 3 TIMES DAILY PRN
Qty: 40 CAPSULE | Refills: 1 | Status: SHIPPED | OUTPATIENT
Start: 2022-06-09 | End: 2023-02-24

## 2022-06-09 NOTE — TELEPHONE ENCOUNTER
PATIENT CALLED TO LET YOU KNOW THAT SHE TESTED POSITIVE Tuesday     SYMPTOMS   COUGH  SORE THROAT  ACHY  TEMP    DIARRHEA     CAN YOU RECOMMEND ANYTHING FOR HER TO TAKE     DELSYM IS NOT HELPING     [id number] [place] [address] AT [facility] [phone] [facility] [phone]   [phone]    WAS IN THE OFFICE 6-2-22

## 2022-06-14 DIAGNOSIS — Z15.09 BRCA1 GENE MUTATION POSITIVE IN FEMALE: Primary | ICD-10-CM

## 2022-06-14 DIAGNOSIS — Z15.01 BRCA1 GENE MUTATION POSITIVE IN FEMALE: Primary | ICD-10-CM

## 2022-06-14 DIAGNOSIS — Z15.02 BRCA1 GENE MUTATION POSITIVE IN FEMALE: Primary | ICD-10-CM

## 2022-07-28 ENCOUNTER — CLINICAL SUPPORT (OUTPATIENT)
Dept: OTHER | Facility: HOSPITAL | Age: 32
End: 2022-07-28

## 2022-07-28 DIAGNOSIS — Z13.79 GENETIC TESTING: Primary | ICD-10-CM

## 2022-07-28 DIAGNOSIS — Z15.01 BRCA1 GENE MUTATION POSITIVE: ICD-10-CM

## 2022-07-28 DIAGNOSIS — Z15.09 BRCA1 GENE MUTATION POSITIVE: ICD-10-CM

## 2022-07-28 PROCEDURE — 96040: CPT | Performed by: GENETIC COUNSELOR, MS

## 2022-07-28 NOTE — PROGRESS NOTES
Precious Daniels, a 32-year old female, was seen for genetic counseling due to a previously identified BRCA1 mutation. Ms. Daniels had BRCA1 testing done through [facility] in 2006 due to a family history of breast and ovarian cancer.  Ms. Daniels was found to have a BRCA1 mutation at this time ([id number]).  She has no personal history of cancer.   Ms. Daniels gets yearly mammograms and recently had her first breast MRI.  The MRI showed an abnormality in the left breast.  Biopsy of the area revealed PASH.  She retains her uterus and ovaries. Ms. Daniels was interested in discussing the risks related to BRCA1 and the management guidelines.     FAMILY HISTORY: (See attached pedigree)  Mother:  BRCA1+  Mat Grandmother: Possible pancreatic cancer  Mat Aunt 1:  Breast cancer, 26 (x3)  Mat Aunt 2:  BRCA1+    We were unable to confirm the diagnoses in these family members.     GENETIC COUNSELING (30 minutes): We reviewed the family history information in detail.  Cases of breast cancer follow three general patterns: sporadic, familial, and hereditary.  While most breast cancer is sporadic, some cases appear to occur in family clusters.  These cases are said to be familial and account for 10-20% of breast cancer cases.  Familial cases may be due to a combination of shared genes and environmental factors among family members.  In even fewer families, the cancer is said to be inherited, and the genes responsible for the cancer are known.      Family histories typical of hereditary cancer syndromes usually include multiple first- and second-degree relatives diagnosed with cancer types that define a syndrome.  These cases tend to be diagnosed at younger-than-expected ages and can be bilateral or multifocal.  The cancer in these families follows an autosomal dominant inheritance pattern, which indicates the likely presence of a mutation in a cancer susceptibility gene.  Children and siblings of an individual believed to carry  this mutation have a 50% chance of inheriting that mutation, thereby inheriting the increased risk to develop cancer.  These mutations can be passed down from the maternal or the paternal lineage.    Hereditary breast cancer accounts for 5-10% of all cases of breast cancer.  A significant proportion of hereditary breast cancer can be attributed to mutations in the BRCA1 and BRCA1 genes.  Mutations in these genes confer an increased risk for breast cancer, ovarian cancer, male breast cancer, prostate cancer and pancreatic cancer.      TEST RESULTS: Genetic testing identified a pathogenic mutation in the BRCA1 gene (c. 3604delA).  This is causative of Hereditary Breast and Ovarian Cancer syndrome (HBOC).  Genetic testing for these mutations can be offered to other family members to determine whether they have also inherited these risk factors. BRCA1 is inherited in an autosomal dominant manner.    Until each at-risk family member has been proven not to carry the BRCA1 mutation, they could be offered increased surveillance.  We would be happy to see family members who live in the area in our clinic to further discuss this information and testing options.  For family members who live elsewhere, there are genetic counselors at most [facility]. They can find a genetic counselor by visiting the National Society of Genetic Counselors website at www.nsgc.org. Testing is available to individuals once they are 18 years of age. Relatives would need a copy of Ms. Daniels’s genetic test result to ensure they were being tested for the correct mutation.    CANCER RISK:  Mutations in BRCA1 confer up to an 87% lifetime risk of breast cancer and up to a 44% lifetime risk of ovarian cancer.  Additionally, mutations in the BRCA1 gene may confer an increased risk of pancreatic cancer, male breast cancer, and prostate cancer.    CANCER SCREENING:  Options available to individuals with a BRCA1 mutation and at high risk for  breast and ovarian cancer were discussed, including increased surveillance, chemoprevention, and prophylactic surgery (mastectomy and/or oophorectomy). Current data does not support routine ovarian cancer screening.  We briefly discussed transvaginal ultrasound and serum CA-125, however neither has been found to be sufficiently sensitive or specific to be recommended for individuals that have an increased risk for ovarian cancer.  Some physicians consider offering screening between ages 30-35, before a patient is at an age where BSO is typically offered.  Ms. Daniels still has her uterus and ovaries.  She plans on having a BSO but is still trying to decide when would be the best time for her to do that.  We discussed how it might be beneficial for her to talk with Dr. Fidelia Dave about her concerns and questions regarding that surgery.    For women with a BRCA1 mutation who choose not to undergo bilateral mastectomy, increased breast cancer surveillance is warranted. Increased surveillance, based on NCCN guidelines, would consist of semi-annual clinical breast exam and monthly self-breast exam starting at age 18 and annual breast MRI starting at age 25, as well as annual mammogram and annual breast MRI beginning at age 30. Breast cancer chemoprevention is another option that Ms. Daniels could consider in the future.  Studies have shown that Tamoxifen and Raloxifene can cut the risk of estrogen receptor positive breast cancer by 50% when taken by high-risk women. A large number of BRCA1-related breast cancers are not estrogen positive and, therefore, the risk-reduction benefit is likely less than 50% for individuals with a BRCA1 mutation. There are risks associated with these medications; therefore, the risks versus benefits must be considered prior to deciding to take chemopreventative medications.  Ms. Daniels is planning to have nipple-sparing bilateral mastectomy in October of this year.    There are no  standardized screening tests that have been proven to be effective in early pancreatic cancer detection.  In the absence of a family history of pancreatic cancer, the NCCN guidelines do not currently recommend pancreatic cancer screening for individuals with a BRCA1 mutation.  In cases where an individual has a BRCA mutation and family history of pancreatic cancer some experts consider screening with endoscopic ultrasound, high-resolution pancreatic protocol CT, or MRI, starting at age 50 or 10 years younger than the earliest diagnosis of pancreas cancer in the family.  Since these screening methods are not standard of care, consideration of referral to a clinical research screening program is appropriate if a patient wishes to pursue screening.  In Ms. Daniels’s case there is a possibility of pancreatic cancer in her maternal grandmother.  She is planning to speak with her mother for clarification on this.    Screening for males with BRCA1 mutations should include self-breast exam, annual clinical breast exam beginning at age 35, and considering prostate cancer screening beginning at age 45. Mammography can be considered in males with gynecomastia.     SURGICAL OPTIONS:  For those who are found to have the BRCA1 mutation and have not had a breast cancer diagnosis, risk-reducing bilateral mastectomy has been shown to reduce the risk of breast cancer by approximately 90%.  Ms. Daniels currently is scheduled for bilateral mastectomy later this year.     Risk-reducing bilateral salpingo-oopherectomy (BSO) has been shown to reduce the risk of ovarian cancer by as much as 96%.  BSO is typically recommended by age 35-40 in women who have a BRCA mutation.  It has been suggested that risk-reducing BSO in high-risk women be done by a surgeon with experience in this population, such as a gynecologic oncologist.  Careful removal of the fallopian tubes is essential, due to the residual risk for fallopian tube cancer in BRCA  positive patients. Additionally, 2-10% of BRCA positive patients are found to have ovarian cancer at the time of BSO; therefore, careful pathologic exam of the ovaries by serial sectioning is recommended.  In addition, cytologic evaluation of peritoneal washings could be considered. Ms. Daniels still retains her uterus and ovaries but is considering BSO in the future.    PLAN:  Genetic counseling remains available for Ms. Daniels and her family. Ms. Daniels was encouraged to contact us at [phone] with any questions or concerns she may have.       Mary Schulz MS, [facility], [facility]  Licensed Certified Genetic Counselor    Cc: Precious Lima MD

## 2022-10-13 ENCOUNTER — TELEPHONE (OUTPATIENT)
Dept: SURGERY | Facility: CLINIC | Age: 32
End: 2022-10-13

## 2022-10-13 NOTE — TELEPHONE ENCOUNTER
[facility] called regarding a MMG order placed by Dr. Lima- pt had decided to have surgery at [facility] with Dr Fabian.  All appts here have been canceled including the MMG in question.     CMA

## 2023-02-22 ENCOUNTER — TELEPHONE (OUTPATIENT)
Dept: FAMILY MEDICINE CLINIC | Facility: CLINIC | Age: 33
End: 2023-02-22

## 2023-02-22 NOTE — TELEPHONE ENCOUNTER
Caller: Precious Daniels    Relationship: Self    Best call back number: [phone]    What orders are you requesting (i.e. lab or imaging): FASTING BLOOD WORK    In what timeframe would the patient need to come in: 02/24/23    Where will you receive your lab/imaging services: IN OFFICE    Additional notes: PATIENT IS WANTING TO CHECK HER SUGAR LEVELS, ETC.     PLEASE ADVISE IF ANY QUESTIONS OR CONCERNS.

## 2023-02-23 DIAGNOSIS — Z83.3 FAMILY HISTORY OF TYPE 2 DIABETES MELLITUS: Primary | ICD-10-CM

## 2023-02-24 ENCOUNTER — OFFICE VISIT (OUTPATIENT)
Dept: FAMILY MEDICINE CLINIC | Facility: CLINIC | Age: 33
End: 2023-02-24
Payer: COMMERCIAL

## 2023-02-24 VITALS
BODY MASS INDEX: 35.68 KG/M2 | WEIGHT: 222 LBS | RESPIRATION RATE: 16 BRPM | SYSTOLIC BLOOD PRESSURE: 126 MMHG | HEIGHT: 66 IN | TEMPERATURE: 97.5 F | HEART RATE: 88 BPM | DIASTOLIC BLOOD PRESSURE: 74 MMHG | OXYGEN SATURATION: 98 %

## 2023-02-24 DIAGNOSIS — J30.1 SEASONAL ALLERGIC RHINITIS DUE TO POLLEN: ICD-10-CM

## 2023-02-24 DIAGNOSIS — N30.00 ACUTE CYSTITIS WITHOUT HEMATURIA: Primary | ICD-10-CM

## 2023-02-24 DIAGNOSIS — M54.50 LUMBAR SPINE PAIN: ICD-10-CM

## 2023-02-24 LAB
ALBUMIN SERPL-MCNC: 4.3 G/DL (ref 3.5–5.2)
ALBUMIN/GLOB SERPL: 2 G/DL
ALP SERPL-CCNC: 82 U/L (ref 39–117)
ALT SERPL-CCNC: 15 U/L (ref 1–33)
APPEARANCE UR: CLEAR
AST SERPL-CCNC: 17 U/L (ref 1–32)
BACTERIA #/AREA URNS HPF: ABNORMAL /HPF
BASOPHILS # BLD AUTO: 0.09 10*3/MM3 (ref 0–0.2)
BASOPHILS NFR BLD AUTO: 1.2 % (ref 0–1.5)
BILIRUB SERPL-MCNC: <0.2 MG/DL (ref 0–1.2)
BILIRUB UR QL STRIP: NEGATIVE
BUN SERPL-MCNC: 17 MG/DL (ref 6–20)
BUN/CREAT SERPL: 23.3 (ref 7–25)
CALCIUM SERPL-MCNC: 9.1 MG/DL (ref 8.6–10.5)
CANCER AG125 SERPL-ACNC: 14.6 U/ML (ref 0–38.1)
CASTS URNS MICRO: ABNORMAL
CHLORIDE SERPL-SCNC: 106 MMOL/L (ref 98–107)
CHOLEST SERPL-MCNC: 186 MG/DL (ref 0–200)
CHOLEST/HDLC SERPL: 2.38 {RATIO}
CO2 SERPL-SCNC: 27.9 MMOL/L (ref 22–29)
COLOR UR: YELLOW
CREAT SERPL-MCNC: 0.73 MG/DL (ref 0.57–1)
EGFRCR SERPLBLD CKD-EPI 2021: 110.8 ML/MIN/1.73
EOSINOPHIL # BLD AUTO: 0.32 10*3/MM3 (ref 0–0.4)
EOSINOPHIL NFR BLD AUTO: 4.2 % (ref 0.3–6.2)
EPI CELLS #/AREA URNS HPF: ABNORMAL /HPF
ERYTHROCYTE [DISTWIDTH] IN BLOOD BY AUTOMATED COUNT: 14.2 % (ref 12.3–15.4)
GLOBULIN SER CALC-MCNC: 2.2 GM/DL
GLUCOSE SERPL-MCNC: 88 MG/DL (ref 65–99)
GLUCOSE UR QL STRIP: NEGATIVE
HCT VFR BLD AUTO: 40 % (ref 34–46.6)
HDLC SERPL-MCNC: 78 MG/DL (ref 40–60)
HGB BLD-MCNC: 13.1 G/DL (ref 12–15.9)
HGB UR QL STRIP: ABNORMAL
IMM GRANULOCYTES # BLD AUTO: 0.02 10*3/MM3 (ref 0–0.05)
IMM GRANULOCYTES NFR BLD AUTO: 0.3 % (ref 0–0.5)
KETONES UR QL STRIP: NEGATIVE
LDLC SERPL CALC-MCNC: 98 MG/DL (ref 0–100)
LEUKOCYTE ESTERASE UR QL STRIP: ABNORMAL
LYMPHOCYTES # BLD AUTO: 1.88 10*3/MM3 (ref 0.7–3.1)
LYMPHOCYTES NFR BLD AUTO: 24.9 % (ref 19.6–45.3)
MCH RBC QN AUTO: 27.3 PG (ref 26.6–33)
MCHC RBC AUTO-ENTMCNC: 32.8 G/DL (ref 31.5–35.7)
MCV RBC AUTO: 83.5 FL (ref 79–97)
MONOCYTES # BLD AUTO: 0.52 10*3/MM3 (ref 0.1–0.9)
MONOCYTES NFR BLD AUTO: 6.9 % (ref 5–12)
NEUTROPHILS # BLD AUTO: 4.71 10*3/MM3 (ref 1.7–7)
NEUTROPHILS NFR BLD AUTO: 62.5 % (ref 42.7–76)
NITRITE UR QL STRIP: NEGATIVE
NRBC BLD AUTO-RTO: 0 /100 WBC (ref 0–0.2)
PH UR STRIP: 5.5 [PH] (ref 5–8)
PLATELET # BLD AUTO: 361 10*3/MM3 (ref 140–450)
POTASSIUM SERPL-SCNC: 4.2 MMOL/L (ref 3.5–5.2)
PROT SERPL-MCNC: 6.5 G/DL (ref 6–8.5)
PROT UR QL STRIP: NEGATIVE
RBC # BLD AUTO: 4.79 10*6/MM3 (ref 3.77–5.28)
RBC #/AREA URNS HPF: ABNORMAL /HPF
SODIUM SERPL-SCNC: 142 MMOL/L (ref 136–145)
SP GR UR STRIP: 1.02 (ref 1–1.03)
TRIGL SERPL-MCNC: 49 MG/DL (ref 0–150)
UROBILINOGEN UR STRIP-MCNC: ABNORMAL MG/DL
VLDLC SERPL CALC-MCNC: 10 MG/DL (ref 5–40)
WBC # BLD AUTO: 7.54 10*3/MM3 (ref 3.4–10.8)
WBC #/AREA URNS HPF: ABNORMAL /HPF

## 2023-02-24 PROCEDURE — 99213 OFFICE O/P EST LOW 20 MIN: CPT | Performed by: INTERNAL MEDICINE

## 2023-02-24 RX ORDER — NITROFURANTOIN 25; 75 MG/1; MG/1
100 CAPSULE ORAL 2 TIMES DAILY
Qty: 14 CAPSULE | Refills: 0 | Status: SHIPPED | OUTPATIENT
Start: 2023-02-24 | End: 2023-02-24

## 2023-02-24 RX ORDER — CETIRIZINE HYDROCHLORIDE, PSEUDOEPHEDRINE HYDROCHLORIDE 5; 120 MG/1; MG/1
1 TABLET, FILM COATED, EXTENDED RELEASE ORAL 2 TIMES DAILY
Qty: 180 TABLET | Refills: 1 | Status: SHIPPED | OUTPATIENT
Start: 2023-02-24 | End: 2023-03-07 | Stop reason: SDUPTHER

## 2023-02-24 RX ORDER — NITROFURANTOIN 25; 75 MG/1; MG/1
100 CAPSULE ORAL 2 TIMES DAILY
Qty: 14 CAPSULE | Refills: 0 | Status: SHIPPED | OUTPATIENT
Start: 2023-02-24

## 2023-03-02 ENCOUNTER — HOSPITAL ENCOUNTER (OUTPATIENT)
Dept: GENERAL RADIOLOGY | Facility: HOSPITAL | Age: 33
Discharge: HOME | End: 2023-03-02
Admitting: INTERNAL MEDICINE
Payer: COMMERCIAL

## 2023-03-02 ENCOUNTER — TELEPHONE (OUTPATIENT)
Dept: FAMILY MEDICINE CLINIC | Facility: CLINIC | Age: 33
End: 2023-03-02
Payer: COMMERCIAL

## 2023-03-02 DIAGNOSIS — M54.50 LUMBAR SPINE PAIN: ICD-10-CM

## 2023-03-02 DIAGNOSIS — M54.50 LUMBAR SPINE PAIN: Primary | ICD-10-CM

## 2023-03-02 PROCEDURE — 72100 X-RAY EXAM L-S SPINE 2/3 VWS: CPT

## 2023-03-02 NOTE — TELEPHONE ENCOUNTER
Called and informed pt that the order was in. Pt also will call back after the x-ray is completed and schedule a follow up with Dr. Yeung.

## 2023-03-02 NOTE — TELEPHONE ENCOUNTER
PATIENT CALLED AND SHE HAS PHYSICAL THERAPY SET UP FOR LATER THIS MONTH, BUT SHE SAID THAT HER AND DR. BLUM DISCUSSED SPINE X-RAYS ALSO, AND PATIENT IS WONDERING IF DR. BLUM CAN GO AHEAD AND ORDER BACK X-RAYS. PLEASE CALL PATIENT [phone]. THANK YOU.

## 2023-03-07 DIAGNOSIS — J30.1 SEASONAL ALLERGIC RHINITIS DUE TO POLLEN: Primary | ICD-10-CM

## 2023-03-07 RX ORDER — CETIRIZINE HYDROCHLORIDE, PSEUDOEPHEDRINE HYDROCHLORIDE 5; 120 MG/1; MG/1
1 TABLET, FILM COATED, EXTENDED RELEASE ORAL 2 TIMES DAILY
Qty: 180 TABLET | Refills: 1 | Status: SHIPPED | OUTPATIENT
Start: 2023-03-07

## 2023-03-09 ENCOUNTER — OFFICE VISIT (OUTPATIENT)
Dept: FAMILY MEDICINE CLINIC | Facility: CLINIC | Age: 33
End: 2023-03-09
Payer: COMMERCIAL

## 2023-03-09 VITALS
WEIGHT: 222.2 LBS | SYSTOLIC BLOOD PRESSURE: 122 MMHG | RESPIRATION RATE: 16 BRPM | BODY MASS INDEX: 35.71 KG/M2 | TEMPERATURE: 98.2 F | DIASTOLIC BLOOD PRESSURE: 80 MMHG | OXYGEN SATURATION: 98 % | HEIGHT: 66 IN | HEART RATE: 110 BPM

## 2023-03-09 DIAGNOSIS — R30.0 DYSURIA: Primary | ICD-10-CM

## 2023-03-09 DIAGNOSIS — M54.50 LUMBAR SPINE PAIN: ICD-10-CM

## 2023-03-09 LAB
BILIRUB BLD-MCNC: NEGATIVE MG/DL
CLARITY, POC: CLEAR
COLOR UR: NORMAL
EXPIRATION DATE: NORMAL
GLUCOSE UR STRIP-MCNC: NEGATIVE MG/DL
KETONES UR QL: NEGATIVE
LEUKOCYTE EST, POC: NEGATIVE
Lab: NORMAL
NITRITE UR-MCNC: NEGATIVE MG/ML
PH UR: 7 [PH] (ref 5–8)
PROT UR STRIP-MCNC: NEGATIVE MG/DL
RBC # UR STRIP: NEGATIVE /UL
SP GR UR: 1.01 (ref 1–1.03)
UROBILINOGEN UR QL: NORMAL

## 2023-03-09 PROCEDURE — 81003 URINALYSIS AUTO W/O SCOPE: CPT | Performed by: INTERNAL MEDICINE

## 2023-03-09 PROCEDURE — 99213 OFFICE O/P EST LOW 20 MIN: CPT | Performed by: INTERNAL MEDICINE

## 2023-03-09 NOTE — PROGRESS NOTES
"Subjective   Precious Daniels is a 32 y.o. female. Patient is here today for   Chief Complaint   Patient presents with   • Results     X-ray and uti          Vitals:    03/09/23 1437   BP: 122/80   Pulse: 110   Resp: 16   Temp: 98.2 °F (36.8 °C)   SpO2: 98%     Body mass index is 35.88 kg/m².      Past Medical History:   Diagnosis Date   • Allergic    • Anemia     Temporarily when I was 14   • Anxiety    • BRCA1 positive 10/16/2017   • Cancer (HCC)     Not me; maternal grandmother and maternal aunt   • Chest pain    • Deep vein thrombosis (HCC)     Not me; maternal grandfather had it.   • Depression    • Diabetes mellitus (HCC)     Not me; father, maternal grandmother, paternal grandfather   • GERD (gastroesophageal reflux disease)    • Heart murmur     Not me; sister has a slight one.   • Hypertension     Not me, my mother and father has it.   • Hypothyroidism    • Injury of back    • Low back pain     Threw out my back once in college   • Myocardial infarction (HCC)     Not me; paternal grandfather had one.   • Obesity     All my life   • Palpitations    • Pneumonia     Had several cases of \"walking pneumonia\"   • Visual impairment     Wear glasses/contacts      Allergies   Allergen Reactions   • Morphine Other (See Comments)     Makes patient agitated, and angry  Makes patient agitated, and angry    Makes patient agitated, and angry  Makes patient agitated, and angry  Makes patient agitated, and angry  Makes patient agitated, and angry  Makes patient agitated, and angry  Makes patient agitated, and angry  Makes patient agitated, and angry  Makes patient agitated, and angry  Makes patient agitated, and angry  Makes patient agitated, and angry  Makes patient agitated, and angry     • Molds & Smuts    • Penicillins Hives, Other (See Comments) and Unknown (See Comments)     Pt states unsure of reaction     • Cefdinir Diarrhea   • Gabapentin Anxiety      Social History     Socioeconomic History   • Marital status: "    Tobacco Use   • Smoking status: Never   • Smokeless tobacco: Never   Vaping Use   • Vaping Use: Never used   Substance and Sexual Activity   • Alcohol use: Yes     Alcohol/week: 2.0 standard drinks     Types: 2 Cans of beer per week     Comment: Social - 2 cans of beer or 2 glasses wine   • Drug use: No   • Sexual activity: Yes     Partners: Female     Birth control/protection: None        Current Outpatient Medications:   •  cetirizine-pseudoephedrine (ZyrTEC-D Allergy & Congestion) 5-120 MG per 12 hr tablet, Take 1 tablet by mouth 2 (Two) Times a Day., Disp: 180 tablet, Rfl: 1  •  multivitamin (THERAGRAN) tablet tablet, , Disp: , Rfl:   •  calcium carbonate EX (TUMS EX) 750 MG chewable tablet, Chew 1 tablet Daily., Disp: , Rfl:   •  nitrofurantoin, macrocrystal-monohydrate, (Macrobid) 100 MG capsule, Take 1 capsule by mouth 2 (Two) Times a Day., Disp: 14 capsule, Rfl: 0     Objective     History of Present Illness  She is here to follow-up regarding discussion of her lumbar spine x-ray.    She has not yet gone to physical therapy but this is actually planned for sometime next week.      Back Pain  This is a recurrent problem. The current episode started 1 to 4 weeks ago. The problem occurs constantly. The problem has been waxing and waning since onset. The pain is present in the lumbar spine and sacro-iliac. The quality of the pain is described as aching and burning. The pain radiates to the left foot and left knee. The symptoms are aggravated by coughing, position, lying down, sitting and standing. Stiffness is present at night and all day. Associated symptoms include numbness and tingling. Risk factors include obesity, poor posture and recent trauma.        Review of Systems   Constitutional: Negative.    HENT: Negative.    Respiratory: Negative.    Cardiovascular: Negative.    Musculoskeletal: Positive for back pain.        She complains of lumbar spine pain and she may be having some difficulty with  left lower extremity radiculopathy but it is difficult for her to describe that potential pain.   Neurological: Positive for tingling and numbness.   Psychiatric/Behavioral: Negative.        Physical Exam  Vitals and nursing note reviewed.   Constitutional:       Appearance: Normal appearance.   Cardiovascular:      Rate and Rhythm: Regular rhythm.      Heart sounds: Normal heart sounds. No murmur heard.    No gallop.   Pulmonary:      Effort: No respiratory distress.      Breath sounds: Normal breath sounds. No wheezing or rales.   Neurological:      Mental Status: She is alert and oriented to person, place, and time.   Psychiatric:         Mood and Affect: Mood normal.         Behavior: Behavior normal.         Thought Content: Thought content normal.           Problems Addressed this Visit        Musculoskeletal and Injuries    Lumbar spine pain   Other Visit Diagnoses     Dysuria    -  Primary    Relevant Orders    POC Urinalysis Dipstick, Automated (Completed)      Diagnoses       Codes Comments    Dysuria    -  Primary ICD-10-CM: R30.0  ICD-9-CM: 788.1     Lumbar spine pain     ICD-10-CM: M54.50  ICD-9-CM: 724.2             PLAN  She did not have dysuria actually.  Her urinalysis today is clear.    She does have chronic lumbar spine pain.  She had experienced this when she was about 18 years old as well.  Her lumbar spine x-ray revealed some hypertrophy of the posterior facets and minimal intra for febrile disc space narrowing.    I am going to have her to go to physical therapy for her low back pain.  If she fails to have improvement with their input, I would be inclined to get an MRI of her lumbar spine.  No follow-ups on file.  Answers for HPI/ROS submitted by the patient on 3/8/2023  What is the primary reason for your visit?: Back Pain

## 2023-03-17 ENCOUNTER — TREATMENT (OUTPATIENT)
Dept: PHYSICAL THERAPY | Facility: CLINIC | Age: 33
End: 2023-03-17
Payer: COMMERCIAL

## 2023-03-17 DIAGNOSIS — S39.012D STRAIN OF LUMBAR REGION, SUBSEQUENT ENCOUNTER: Primary | ICD-10-CM

## 2023-03-17 DIAGNOSIS — M54.50 BILATERAL LOW BACK PAIN WITHOUT SCIATICA, UNSPECIFIED CHRONICITY: ICD-10-CM

## 2023-03-17 PROCEDURE — 97161 PT EVAL LOW COMPLEX 20 MIN: CPT | Performed by: PHYSICAL THERAPIST

## 2023-03-17 NOTE — PROGRESS NOTES
Physical Therapy Initial Evaluation and Plan of Care    Patient: Precious Daniels   : 1990  Diagnosis/ICD-10 Code:  Strain of lumbar region, subsequent encounter [S39.012D]  Referring practitioner: Chavez Yeung MD  Past medical Hx reviewed: 3/17/2023  Subjective Evaluation    History of Present Illness  Mechanism of injury: I have been continuing with my PT activity and I have built it into my weekly routine.  I was doing fine overall but I had to have surgery (Double Mastectomy 2022, 2nd sx. 2022) and I cannot get to the therapy exercises like I was used to.  I cannot get into the correct positions to stretch and I have let my back exercises go.  Over time the back has slowly started to irritate me more and then recently (), I picked up a wide heavy tray that really made my back hurt following that.   The pain feels center and along the spine.     I was able to see my primary care doctor and they too x-rays and they noted just a little bit of narrowing between L3-L4.    I don't get any leg pain but the L side seems to feel that there is some tension that runs down the front of the left thigh to the front of the foot.        Patient Occupation: [employer], advisor.   Quality of life: good    Pain  Current pain ratin (tightness. )  At best pain ratin  At worst pain ratin (Sitting in chair at work.  )  Location: Center low back.   Quality: burning  Relieving factors: medications (IBU initially but not as much help)  Aggravating factors: lifting, prolonged positioning, repetitive movement, ambulation and sleeping (Sitting too long and then walking.  Fatigues.  Lying flat hurts the low back.)  Progression: improved    Social Support  Lives in: multiple-level home  Lives with: spouse    Treatments  Previous treatment: physical therapy  Patient Goals  Patient goals for therapy: increased strength, decreased pain, increased motion and independence with  ADLs/IADLs      PLOF: Independent with hx of back pain and breast surgery x 2     Objective          Active Range of Motion   Cervical/Thoracic Spine     Thoracic   Left lateral flexion: WFL  Right lateral flexion: with pain  Left rotation: Active left thoracic rotation: 75%    Right rotation: Active right thoracic rotation: 50%     Strength/Myotome Testing     Left Hip   Planes of Motion   Flexion: 4  Abduction: 4-  Adduction: 5  External rotation: 4  Internal rotation: 4+    Right Hip   Planes of Motion   Flexion: 4  Extension: 4-  Abduction: 4-  Adduction: 5  External rotation: 4+  Internal rotation: 4    Tests     Left Hip   Negative OTILIA.   Juan: Positive.     Right Hip   Negative OTILIA.   Juan: Positive (less restriction on R ).     Additional Tests Details  ASLR:  SMCD   OTILIA:  SMCD      Functional Assessment     Comments  SFMA:  Top tier   C-Spine: Rot:  DN (B)   C-Spine Flex: DN   C-SPine Ext: DN   UE Pattern 1:  DN( L), DP: (R):  Chest discomfort.  UE Pattern 2: DN   MSF:  DN:  Flattened spine, hamstring and lumbar tension.    MSE: DN: No anterior ASIS translation, lumbar extension lordosis. Scapula don't pass heels  MSR:  DN:  R: 50 %, L: 75 %   SLS:  R: FN, L: DN.    ADDS:  DN:  25% ( motion)      BREAKOUTS:    Seated trunk rotation:  Mobility dysfunction   Seated hip ROT:  R ER:  (DN): SCMD.  L; ER, (FN).         Assessment & Plan     Assessment  Impairments: abnormal or restricted ROM, activity intolerance, impaired physical strength, lacks appropriate home exercise program, pain with function and safety issue  Functional Limitations: carrying objects, lifting, sleeping, walking, uncomfortable because of pain, moving in bed, sitting and unable to perform repetitive tasks  Assessment details: Pt presents to PT with symptoms consistent with Lumbar instability and poor postural control.  Pronounced hip weakness and upper trunk hypomobility noted. Pt would benefit from skilled PT intervention to  address the deficits noted.     Prognosis: good    Goals  Plan Goals: SHORT TERM GOALS: Time for Goal Achievement: 8 Visits   1.  Patient to be compliant and progression of HEP                             2.  Pain level < 1/10 at worst with mentioned activities to improve function  3.  Increased thoracic, lumbar and SIJ mobility to allow for increased lumbar AROM with less pain.  4.  Increased thoracic AROM to by 25% in all planes to allow for increased ease with sit-stand transfers and functional activities.  5.  Pt to improve functional pattern 1 reach to Functional Non-painful.      LONG TERM GOALS: Time for Goal Achievement: 16 visits  1.  Pt. to score < 10 % on Back Index  2.  Pain level < 0/10 with all listed activities to return to normal work and daily tasks.  3.  Lumbar AROM to WFL (Functional Non-painful MSE, MSR, MSF ) to allow for return to household & recreational activities w/o increased symptoms.  4.  (B) LE and lower abdominal strength to 5/5 to allow for pushing, pulling and activities to occur without pain (driving, sitting, household  & Job requirements).  5.  Pt able to sit for > 60 min and stand without increased pain in the lumbar spine.    6.  Pt to report the ability to lift > 30 lbs for to waist x 10 without increased pain in lumbar spine.    7.  SLS on L 30 sec. Without increased symptoms demonstrating increased lumbosacral and hip stability.          Plan  Therapy options: will be seen for skilled therapy services  Planned modality interventions: cryotherapy, electrical stimulation/Russian stimulation, TENS, thermotherapy (hydrocollator packs), ultrasound and dry needling  Other planned modality interventions: Cupping  Planned therapy interventions: body mechanics training, flexibility, functional ROM exercises, home exercise program, manual therapy, neuromuscular re-education, postural training, spinal/joint mobilization, stretching, strengthening, soft tissue mobilization, therapeutic  activities, motor coordination training, joint mobilization and abdominal trunk stabilization  Frequency: 2x week  Duration in visits: 16  Treatment plan discussed with: patient      Manual Therapy:    -     mins  72838;  Therapeutic Exercise:    -    mins  03259;     Neuromuscular Nitish:    -    mins  21515;    Therapeutic Activity:     -     mins  95805;     Gait Training:      --     mins  60762;     Ultrasound:     -     mins  48218;    Electrical Stimulation:    -     mins  24608 ( );  Dry Needling     -     mins self-pay    Timed Treatment:   -   mins   Total Treatment:     60   mins    PT SIGNATURE:  Demetris Garland DPT, PT     SOREN Mireles License #: [id number]    DATE TREATMENT INITIATED: 3/20/2023    Initial Certification  Certification Period: 6/18/2023  I certify that the therapy services are furnished while this patient is under my care.  The services outlined above are required by this patient, and will be reviewed every 90 days.     PHYSICIAN: Chavez Yeung MD      DATE:     Please sign and return via fax to [phone].. Thank you, [facility].

## 2023-03-22 ENCOUNTER — TREATMENT (OUTPATIENT)
Dept: PHYSICAL THERAPY | Facility: CLINIC | Age: 33
End: 2023-03-22
Payer: COMMERCIAL

## 2023-03-22 DIAGNOSIS — S39.012D STRAIN OF LUMBAR REGION, SUBSEQUENT ENCOUNTER: Primary | ICD-10-CM

## 2023-03-22 DIAGNOSIS — M54.50 BILATERAL LOW BACK PAIN WITHOUT SCIATICA, UNSPECIFIED CHRONICITY: ICD-10-CM

## 2023-03-22 PROCEDURE — 97110 THERAPEUTIC EXERCISES: CPT | Performed by: PHYSICAL THERAPIST

## 2023-03-22 NOTE — PROGRESS NOTES
Physical Therapy Daily Treatment Note      Patient: Precious Daniels   : 1990  Referring practitioner: Chavez Yeung MD  Date of Initial Visit: Type: THERAPY  Noted: 3/17/2023  Today's Date: 3/22/2023  Patient seen for 2 sessions       Visit Diagnoses:    ICD-10-CM ICD-9-CM   1. Strain of lumbar region, subsequent encounter  S39.012D V58.89     847.2   2. Bilateral low back pain without sciatica, unspecified chronicity  M54.50 724.2     Subjective: No new complaints today, i'm just nervous about messing anything up with exercises.      Objective:   See Exercise, Manual, and Modality Logs for complete treatment.     Assessment/Plan : We were very intentional and mindful of her chest discomfort and concern for increased symptom provocation.  She did well with treatment and was given detailed HEP.  Thorocic hypomobility noted with poor core control and hip flexor tightness.        Progress per Plan of Care and Progress strengthening /stabilization /functional activity    Timed:         Manual Therapy:    -     mins  93171;     Therapeutic Exercise:    52     mins  04920;     Neuromuscular Nitish:    -    mins  42146;    Therapeutic Activity:     -     mins  45853;     Gait Training:      -     mins  44001;     Ultrasound:     -     mins  32440;    Electrical Stimulation:    -     mins  48118 ( );  Dry Needling     -     mins self-pay  Traction     -     mins 45365      Timed Treatment:   52   mins   Total Treatment:     52   mins    SOREN Mireles License: [id number]

## 2023-03-24 ENCOUNTER — TREATMENT (OUTPATIENT)
Dept: PHYSICAL THERAPY | Facility: CLINIC | Age: 33
End: 2023-03-24
Payer: COMMERCIAL

## 2023-03-24 DIAGNOSIS — M54.50 BILATERAL LOW BACK PAIN WITHOUT SCIATICA, UNSPECIFIED CHRONICITY: ICD-10-CM

## 2023-03-24 DIAGNOSIS — S39.012D STRAIN OF LUMBAR REGION, SUBSEQUENT ENCOUNTER: Primary | ICD-10-CM

## 2023-03-24 PROCEDURE — 97110 THERAPEUTIC EXERCISES: CPT | Performed by: PHYSICAL THERAPIST

## 2023-03-24 NOTE — PROGRESS NOTES
Physical Therapy Daily Treatment Note      Patient: Precious Daniels   : 1990  Referring practitioner: Chavez Yeung MD  Date of Initial Visit: Type: THERAPY  Noted: 3/17/2023  Today's Date: 3/24/2023  Patient seen for 3 sessions       Visit Diagnoses:    ICD-10-CM ICD-9-CM   1. Strain of lumbar region, subsequent encounter  S39.012D V58.89     847.2   2. Bilateral low back pain without sciatica, unspecified chronicity  M54.50 724.2     Subjective I did fine after the last visit.  I felt that I did some activity but no increased symptoms.  The only exercise I was able to reproduce at home was the doorway stretch and that went fine.  I had to be at a [employer] recruitment event yesterday and stand for about 3 hours because they didn't have any chairs.  I had a flat dress shoe on but I did feel my back after the long period of standing.      Objective:   See Exercise, Manual, and Modality Logs for complete treatment.     Assessment/Plan Today's visit consisted of review of existing HEP due to reported inability to practice/perform at home.  We also progressed into some low level strengthening of the hips as well but not added to HEP as of yet.  Will add next visit.      Progress per Plan of Care and Progress strengthening /stabilization /functional activity    Timed:         Manual Therapy:    -     mins  53425;     Therapeutic Exercise:    55     mins  07797;     Neuromuscular Nitish:    -    mins  22605;    Therapeutic Activity:     -     mins  55143;     Gait Training:      -     mins  03149;     Ultrasound:     -     mins  42960;    Electrical Stimulation:    -     mins  58754 ( );  Dry Needling     -     mins self-pay  Traction     -     mins 93778      Timed Treatment:   55   mins   Total Treatment:     55   mins    Demetris Garland PT  [place] License: [id number]

## 2023-03-27 ENCOUNTER — TREATMENT (OUTPATIENT)
Dept: PHYSICAL THERAPY | Facility: CLINIC | Age: 33
End: 2023-03-27
Payer: COMMERCIAL

## 2023-03-27 DIAGNOSIS — S39.012D STRAIN OF LUMBAR REGION, SUBSEQUENT ENCOUNTER: Primary | ICD-10-CM

## 2023-03-27 DIAGNOSIS — M54.50 BILATERAL LOW BACK PAIN WITHOUT SCIATICA, UNSPECIFIED CHRONICITY: ICD-10-CM

## 2023-03-27 PROCEDURE — 97110 THERAPEUTIC EXERCISES: CPT | Performed by: PHYSICAL THERAPIST

## 2023-03-27 PROCEDURE — 97530 THERAPEUTIC ACTIVITIES: CPT | Performed by: PHYSICAL THERAPIST

## 2023-03-27 NOTE — PROGRESS NOTES
"Physical Therapy Daily Treatment Note      Patient: Precious Daniels   : 1990  Referring practitioner: No ref. provider found  Date of Initial Visit: Type: THERAPY  Noted: 3/17/2023  Today's Date: 3/27/2023  Patient seen for 4 sessions         Precious Daniels reports: pain in low back better.  Now with \"burning\" in mid back.   Did better with exercise performance at home after last session.  Have noticed that floor is hard/uncomfortable with exercises.        Subjective     Objective   See Exercise, Manual, and Modality Logs for complete treatment.   Exercise rationale/ pain free exercise performance  Anatomy and structure of affected musculature  Posture/Postural awareness  Lumbar support/thoracic support  Proper workstation set up  Sleeping positions with pillows  Alternate exercise positions - seated sktc, LTR,   Verbal/Tactile cues to ensure correct exercise performance/technique  Use of yoga mat/foam mat for comfort during floor portion of exercise.          Assessment/Plan  Subjectively reporting improvement of LB symptoms since initiation of PT.  Better able to perform exercises at home with re instruction. Able to perform increased exercise/functional activity reps/duration without increased mid/low back symptoms/discomfort.  Benefits from verbal/tactile cues to ensure proper exercise performance/technique and positioning.        Progress per Plan of Care toward all goals.           Timed:  Manual Therapy:         mins  01815;  Therapeutic Exercise:    35     mins  16315;     Neuromuscular Nitish:        mins  76740;    Therapeutic Activity:     10     mins  95513;     Gait Training:           mins  84212;     Ultrasound:          mins  70727;      Untimed:  Electrical Stimulation:         mins  19082 ( );  Mechanical Traction:         mins  69410;     Timed Treatment:  45    mins   Total Treatment:   45     mins  Allan Chiang PTA  Physical Therapist  Assistant  [id number]  "

## 2023-04-04 ENCOUNTER — TREATMENT (OUTPATIENT)
Dept: PHYSICAL THERAPY | Facility: CLINIC | Age: 33
End: 2023-04-04
Payer: COMMERCIAL

## 2023-04-04 DIAGNOSIS — S39.012D STRAIN OF LUMBAR REGION, SUBSEQUENT ENCOUNTER: Primary | ICD-10-CM

## 2023-04-04 DIAGNOSIS — M54.50 BILATERAL LOW BACK PAIN WITHOUT SCIATICA, UNSPECIFIED CHRONICITY: ICD-10-CM

## 2023-04-04 PROCEDURE — 97110 THERAPEUTIC EXERCISES: CPT | Performed by: PHYSICAL THERAPIST

## 2023-04-04 PROCEDURE — 97112 NEUROMUSCULAR REEDUCATION: CPT | Performed by: PHYSICAL THERAPIST

## 2023-04-04 NOTE — PROGRESS NOTES
Physical Therapy Daily Treatment Note  Visit # 5        Patient: Precious Daniels   : 1990  Referring practitioner: Chavez Yeung MD  Date of Initial Evaluation:  Type: THERAPY  Noted: 3/17/2023  Today's Date: 2023           ICD-10-CM ICD-9-CM   1. Strain of lumbar region, subsequent encounter  S39.012D V58.89     847.2   2. Bilateral low back pain without sciatica, unspecified chronicity  M54.50 724.2       Subjective  Precious Daniels reports:   I have been doing well lately, the pain is no longer in my lower back, it's more in the mid back below my shoulder blade level, about 3/10 today.  I  have a surgery scheduled next week.      Objective   See Exercise, Manual, and Modality Logs for complete treatment.   Reviewed current HEP, progressed therex program with exercises as noted.    Assessment/Plan  Tolerated continued progression of therapeutic exercise/therapeutic activity well today, no increased pain reported during or after exercises.  Would continue to benefit from skilled PT progressing with lumbar and thoracic stabilization, mobility and core strength.       Progress per Plan of Care and Progress strengthening /stabilization /functional activity, follow up with evaluating PT next visit.            Timed:         Manual Therapy:         mins  68789     Therapeutic Exercise:     40    mins  77906     Neuromuscular Nitish:    12    mins  51765    Therapeutic Activity:          mins  04146     Gait Training:           mins  55427     Ultrasound:          mins  59118    Ionto                                   mins  12797  Self Care                            mins  30688    Un-Timed:  Electrical Stimulation:         mins 27670 ( )  Traction          mins 77543    Timed Treatment:   52   mins   Total Treatment:     52   mins    ZAINA Jimenez License #[id number]  Physical Therapist Assistant

## 2023-04-10 ENCOUNTER — TREATMENT (OUTPATIENT)
Dept: PHYSICAL THERAPY | Facility: CLINIC | Age: 33
End: 2023-04-10
Payer: COMMERCIAL

## 2023-04-10 DIAGNOSIS — S39.012D STRAIN OF LUMBAR REGION, SUBSEQUENT ENCOUNTER: Primary | ICD-10-CM

## 2023-04-10 DIAGNOSIS — M54.50 BILATERAL LOW BACK PAIN WITHOUT SCIATICA, UNSPECIFIED CHRONICITY: ICD-10-CM

## 2023-04-10 PROCEDURE — 97530 THERAPEUTIC ACTIVITIES: CPT | Performed by: PHYSICAL THERAPIST

## 2023-04-10 PROCEDURE — 97110 THERAPEUTIC EXERCISES: CPT | Performed by: PHYSICAL THERAPIST

## 2023-04-10 NOTE — PROGRESS NOTES
Physical Therapy Progress Note      Patient: Precious Daniels   : 1990  Referring practitioner: Chavez Yeung MD  Date of Initial Visit: Type: THERAPY  Noted: 3/17/2023  Today's Date: 4/10/2023  Patient seen for 6 sessions       Visit Diagnoses:    ICD-10-CM ICD-9-CM   1. Strain of lumbar region, subsequent encounter  S39.012D V58.89     847.2   2. Bilateral low back pain without sciatica, unspecified chronicity  M54.50 724.2     Subjective In preparation for a surgical procedure I'll be having on , I was moving some things and lifting some things    Objective:   Evaluation Vs. CURRENT  Active Range of Motion   Cervical/Thoracic Spine      Thoracic   Left lateral flexion: WFL  Right lateral flexion: with pain  Left rotation: Active left thoracic rotation: 75%  (75%)  Right rotation: Active right thoracic rotation: 50% (75%)       Strength/Myotome Testing      Left Hip   Planes of Motion   Flexion: 4 (5-/5)   Abduction: 4- (5-/5)   Adduction: 5  External rotation: 4 (5-/5)   Internal rotation: 4+ (5/5)      Right Hip   Planes of Motion   Flexion: 4 (4+/5)   Extension: 4- (5-/5)   Abduction: 4- (5-/5)   Adduction: 5  External rotation: 4+ (5-/5)  Internal rotation: 4 (5/5)      Tests      Left Hip   Negative OTILIA.   Juan: Positive. (Negative This date)      Right Hip   Negative OTILIA.   Juan: Positive (less restriction on R ). (Negative this date)    Additional Tests Details  ASLR:  SMCD   OTILIA:  SMCD      Functional Assessment      Comments  SFMA:  Top tier   C-Spine: Rot:  DN (B) (Continues)   C-Spine Flex: DN (FN)   C-SPine Ext: DN  (Continues, 70 deg )   UE Pattern 1:  DN( L), DP: (R):  Chest discomfort.  (L): FN, R: DN.   UE Pattern 2: DN.  (R: DN, L: FN)  MSF:  DN:  Flattened spine, hamstring and lumbar tension.  (DN: fingers to top of shoes)  MSE: DN: No anterior ASIS translation, lumbar extension lordosis. Scapula don't pass heels.  (FN)  MSR:  DN:  R: 50 %, L: 75 % (DN:  75 %  B)  SLS:  R: FN, L: DN.  (FN)   ADDS:  DN:  25% ( motion)  (DN:  50 % motion with heels down)      BREAKOUTS:    Seated trunk rotation:  Mobility dysfunction (ongoing)   Seated hip ROT:  R ER:  (DN): SCMD. (Upgraded to FN)   L; ER, (FN).    See Exercise, Manual, and Modality Logs for complete treatment.     Assessment/Plan: Lita has done well with PT intervention and has been able to meet all STGs but LTG not met yet.   We will have to hold on further PT intervention at this time due to pending surgery in 3 days.  She has been educated on sleep positions and continued stretching to tolerance and withing post-surgical parameters.  She was informed to return as soon as the doctor releases her to.    Awaiting MD orders    Timed:         Manual Therapy:    -     mins  07386;     Therapeutic Exercise:    45     mins  26594;     Neuromuscular Nitish:    -    mins  58495;    Therapeutic Activity:     15     mins  44662;   Tests and measures + positional education post-operatively.   Gait Training:      -     mins  58781;     Ultrasound:     -     mins  78904;    Electrical Stimulation:    -     mins  24677 ( );  Dry Needling     -     mins self-pay  Traction     -     mins 82669      Timed Treatment:   60   mins   Total Treatment:     65   mins    SOREN Mireles License: [id number]

## 2023-06-22 PROBLEM — M25.571 ARTHRALGIA OF BOTH ANKLES: Status: ACTIVE | Noted: 2023-06-22

## 2023-06-22 PROBLEM — M25.572 ARTHRALGIA OF BOTH ANKLES: Status: ACTIVE | Noted: 2023-06-22

## 2023-07-28 RX ORDER — SCOLOPAMINE TRANSDERMAL SYSTEM 1 MG/1
1 PATCH, EXTENDED RELEASE TRANSDERMAL
Qty: 4 EACH | Refills: 1 | Status: SHIPPED | OUTPATIENT
Start: 2023-07-28

## 2023-08-21 ENCOUNTER — TELEPHONE (OUTPATIENT)
Dept: FAMILY MEDICINE CLINIC | Facility: CLINIC | Age: 33
End: 2023-08-21

## 2023-08-21 NOTE — TELEPHONE ENCOUNTER
Caller: Precious Daniels    Relationship to patient: Self    Best call back number: [phone]    Patient is needing: THEY NEED A BACKDATED PRIOR AUTH ON cetirizine-pseudoephedrine (ZyrTEC-D Allergy & Congestion) 5-120 MG per 12 hr tablet

## 2023-10-31 ENCOUNTER — OFFICE VISIT (OUTPATIENT)
Dept: FAMILY MEDICINE CLINIC | Facility: CLINIC | Age: 33
End: 2023-10-31
Payer: COMMERCIAL

## 2023-10-31 VITALS
TEMPERATURE: 98.8 F | BODY MASS INDEX: 36.1 KG/M2 | HEART RATE: 101 BPM | HEIGHT: 67 IN | SYSTOLIC BLOOD PRESSURE: 124 MMHG | DIASTOLIC BLOOD PRESSURE: 72 MMHG | OXYGEN SATURATION: 98 % | RESPIRATION RATE: 16 BRPM | WEIGHT: 230 LBS

## 2023-10-31 DIAGNOSIS — R05.9 COUGH, UNSPECIFIED TYPE: ICD-10-CM

## 2023-10-31 DIAGNOSIS — H65.02 ACUTE SEROUS OTITIS MEDIA OF LEFT EAR, RECURRENCE NOT SPECIFIED: ICD-10-CM

## 2023-10-31 DIAGNOSIS — J06.9 ACUTE URI: Primary | ICD-10-CM

## 2023-10-31 DIAGNOSIS — J02.9 PHARYNGITIS, UNSPECIFIED ETIOLOGY: ICD-10-CM

## 2023-10-31 PROBLEM — H52.10 MYOPIA: Status: ACTIVE | Noted: 2017-02-09

## 2023-10-31 LAB
EXPIRATION DATE: NORMAL
EXPIRATION DATE: NORMAL
FLUAV AG UPPER RESP QL IA.RAPID: NOT DETECTED
FLUBV AG UPPER RESP QL IA.RAPID: NOT DETECTED
INTERNAL CONTROL: NORMAL
INTERNAL CONTROL: NORMAL
Lab: NORMAL
Lab: NORMAL
S PYO AG THROAT QL: NEGATIVE
SARS-COV-2 AG UPPER RESP QL IA.RAPID: NOT DETECTED

## 2023-10-31 PROCEDURE — 87880 STREP A ASSAY W/OPTIC: CPT | Performed by: NURSE PRACTITIONER

## 2023-10-31 PROCEDURE — 87428 SARSCOV & INF VIR A&B AG IA: CPT | Performed by: NURSE PRACTITIONER

## 2023-10-31 PROCEDURE — 99214 OFFICE O/P EST MOD 30 MIN: CPT | Performed by: NURSE PRACTITIONER

## 2023-10-31 RX ORDER — FLUTICASONE PROPIONATE 50 MCG
2 SPRAY, SUSPENSION (ML) NASAL DAILY
Qty: 16 G | Refills: 0 | Status: SHIPPED | OUTPATIENT
Start: 2023-10-31 | End: 2023-11-14

## 2023-10-31 RX ORDER — PREDNISONE 20 MG/1
20 TABLET ORAL 2 TIMES DAILY
Qty: 6 TABLET | Refills: 0 | Status: SHIPPED | OUTPATIENT
Start: 2023-10-31 | End: 2023-11-03

## 2023-10-31 RX ORDER — BENZONATATE 100 MG/1
100 CAPSULE ORAL 3 TIMES DAILY PRN
Qty: 30 CAPSULE | Refills: 0 | Status: SHIPPED | OUTPATIENT
Start: 2023-10-31

## 2023-10-31 RX ORDER — AZELASTINE 1 MG/ML
1 SPRAY, METERED NASAL 2 TIMES DAILY
Qty: 1 EACH | Refills: 0 | Status: SHIPPED | OUTPATIENT
Start: 2023-10-31 | End: 2023-11-14

## 2023-10-31 NOTE — PROGRESS NOTES
"Subjective     Precious Daniels is a 33 y.o.. female.     Cough  Associated symptoms include headaches and a sore throat. Pertinent negatives include no ear pain, fever, postnasal drip, rhinorrhea or shortness of breath.   Sore Throat   Associated symptoms include coughing (dry), diarrhea and headaches. Pertinent negatives include no congestion, ear pain, shortness of breath or vomiting.   URI   This is a new problem. Episode onset: 7 days. Associated symptoms include coughing (dry), diarrhea, headaches and a sore throat. Pertinent negatives include no congestion, ear pain, nausea, rhinorrhea or vomiting.       The following portions of the patient's history were reviewed and updated as appropriate: allergies, current medications, past family history, past medical history, past social history, past surgical history and problem list.    Past Medical History:   Diagnosis Date    Allergic     Anemia     Temporarily when I was 14    Anxiety     BRCA1 positive 10/16/2017    Cancer     Not me; maternal grandmother and maternal aunt    Chest pain     Deep vein thrombosis     Not me; maternal grandfather had it.    Depression     Diabetes mellitus     Not me; father, maternal grandmother, paternal grandfather    GERD (gastroesophageal reflux disease)     Heart murmur     Not me; sister has a slight one.    Hypertension     Not me, my mother and father has it.    Hypothyroidism     Injury of back     Low back pain     Threw out my back once in college    Myocardial infarction     Not me; paternal grandfather had one.    Obesity     All my life    Palpitations     Pneumonia     Had several cases of \"walking pneumonia\"    Visual impairment     Wear glasses/contacts       Past Surgical History:   Procedure Laterality Date    BARIATRIC SURGERY  2011    LapBand - Used to be between 280 and 300 pounds    BREAST SURGERY  2022    double-mastectomy and expanders put in    CHOLECYSTECTOMY      HERNIA REPAIR  2011    hiatal hernia " "performed at the same time as lap band surgery    LAPAROSCOPIC GASTRIC BANDING         Review of Systems   Constitutional:  Negative for fever.   HENT:  Positive for sore throat. Negative for congestion, ear pain, postnasal drip and rhinorrhea.    Respiratory:  Positive for cough (dry). Negative for shortness of breath.    Gastrointestinal:  Positive for diarrhea. Negative for nausea and vomiting.   Neurological:  Positive for headaches.       Allergies   Allergen Reactions    Morphine Other (See Comments)     Makes patient agitated, and angry  Makes patient agitated, and angry    Makes patient agitated, and angry  Makes patient agitated, and angry  Makes patient agitated, and angry  Makes patient agitated, and angry  Makes patient agitated, and angry  Makes patient agitated, and angry  Makes patient agitated, and angry  Makes patient agitated, and angry  Makes patient agitated, and angry  Makes patient agitated, and angry  Makes patient agitated, and angry      Molds & Smuts     Penicillins Hives, Other (See Comments) and Unknown (See Comments)     Pt states unsure of reaction      Cefdinir Diarrhea    Gabapentin Anxiety       Objective     Vitals:    10/31/23 1301   BP: 124/72   Pulse: 101   Resp: 16   Temp: 98.8 °F (37.1 °C)   SpO2: 98%   Weight: 104 kg (230 lb)   Height: 170.2 cm (67.01\")     Body mass index is 36.01 kg/m².    Physical Exam  Vitals reviewed.   Constitutional:       Appearance: She is well-developed.   HENT:      Head: Normocephalic and atraumatic.      Right Ear: Tympanic membrane normal. Tympanic membrane is not erythematous.      Left Ear: Tympanic membrane normal. A middle ear effusion (clear fluid) is present. Tympanic membrane is not erythematous.      Nose: Nose normal. Congestion (minor) present.      Mouth/Throat:      Mouth: Mucous membranes are moist.      Pharynx: Oropharyngeal exudate (clear PND) present. No pharyngeal swelling or posterior oropharyngeal erythema.   Eyes:      " Conjunctiva/sclera: Conjunctivae normal.      Pupils: Pupils are equal, round, and reactive to light.   Cardiovascular:      Rate and Rhythm: Normal rate and regular rhythm.      Heart sounds: No murmur heard.  Pulmonary:      Effort: Pulmonary effort is normal. No accessory muscle usage or respiratory distress.      Breath sounds: Normal breath sounds. No decreased breath sounds, wheezing, rhonchi or rales.   Musculoskeletal:         General: Normal range of motion.   Lymphadenopathy:      Cervical: No cervical adenopathy.   Skin:     General: Skin is warm and dry.   Neurological:      Mental Status: She is alert and oriented to person, place, and time.           Current Outpatient Medications:     calcium carbonate EX (TUMS EX) 750 MG chewable tablet, Chew 1 tablet Daily., Disp: , Rfl:     cetirizine-pseudoephedrine (ZyrTEC-D Allergy & Congestion) 5-120 MG per 12 hr tablet, Take 1 tablet by mouth 2 (Two) Times a Day., Disp: 180 tablet, Rfl: 1    multivitamin (THERAGRAN) tablet tablet, , Disp: , Rfl:     Scopolamine 1 MG/3DAYS patch, Place 1 patch on the skin as directed by provider Every 72 (Seventy-Two) Hours., Disp: 4 each, Rfl: 1    azelastine (ASTELIN) 0.1 % nasal spray, 1 spray into the nostril(s) as directed by provider 2 (Two) Times a Day for 14 days. Use in each nostril as directed, Disp: 1 each, Rfl: 0    benzonatate (Tessalon Perles) 100 MG capsule, Take 1 capsule by mouth 3 (Three) Times a Day As Needed for Cough., Disp: 30 capsule, Rfl: 0    fluticasone (FLONASE) 50 MCG/ACT nasal spray, 2 sprays into the nostril(s) as directed by provider Daily for 14 days., Disp: 16 g, Rfl: 0    predniSONE (DELTASONE) 20 MG tablet, Take 1 tablet by mouth 2 (Two) Times a Day for 3 days., Disp: 6 tablet, Rfl: 0    Recent Results (from the past 2016 hour(s))   POC Rapid Strep A    Collection Time: 10/31/23  1:21 PM    Specimen: Swab   Result Value Ref Range    Rapid Strep A Screen Negative Negative, VALID, INVALID, Not  Performed    Internal Control Passed Passed    Lot Number [id number]     Expiration Date 12-    POCT SARS-CoV-2 + Flu Antigen SUKHI    Collection Time: 10/31/23  1:22 PM    Specimen: Swab   Result Value Ref Range    SARS Antigen Not Detected Not Detected, Presumptive Negative    Influenza A Antigen SUKHI Not Detected Not Detected    Influenza B Antigen SUKHI Not Detected Not Detected    Internal Control Passed Passed    Lot Number [id number]     Expiration Date 04-              Diagnoses and all orders for this visit:    1. Acute URI (Primary)    2. Acute serous otitis media of left ear, recurrence not specified  -     fluticasone (FLONASE) 50 MCG/ACT nasal spray; 2 sprays into the nostril(s) as directed by provider Daily for 14 days.  Dispense: 16 g; Refill: 0  -     azelastine (ASTELIN) 0.1 % nasal spray; 1 spray into the nostril(s) as directed by provider 2 (Two) Times a Day for 14 days. Use in each nostril as directed  Dispense: 1 each; Refill: 0    3. Pharyngitis, unspecified etiology  -     predniSONE (DELTASONE) 20 MG tablet; Take 1 tablet by mouth 2 (Two) Times a Day for 3 days.  Dispense: 6 tablet; Refill: 0    4. Cough, unspecified type  -     POC Rapid Strep A  -     POCT SARS-CoV-2 + Flu Antigen SUKHI  -     benzonatate (Tessalon Perles) 100 MG capsule; Take 1 capsule by mouth 3 (Three) Times a Day As Needed for Cough.  Dispense: 30 capsule; Refill: 0        Patient Instructions   Drink plenty of fluids-water preferably, eat a heart healthy diet, get plenty of sleep and do warm salt water gargles twice a day until feeling better    Return if symptoms worsen or fail to improve, for Dr. Yeung as needed/as recommended.

## 2024-10-16 ENCOUNTER — TELEPHONE (OUTPATIENT)
Dept: FAMILY MEDICINE CLINIC | Facility: CLINIC | Age: 34
End: 2024-10-16
Payer: COMMERCIAL

## 2024-10-16 NOTE — TELEPHONE ENCOUNTER
Patient is requesting a glucose check to check for diabetes and a  during her lab appointment on 12/12.

## 2024-10-16 NOTE — TELEPHONE ENCOUNTER
Caller: Precious Daniels    Relationship to patient: Self    Best call back number:     Patient is needing: PATIENT WOULD LIKE A CALLBACK TO ADVISE HER IF THE OFFICE CAN GET HER IN THIS YEAR FOR A PHYSICAL/BIOMETRIC SCREENING AND LAB WORK.  SHE STATES IF SHE DOES NOT HAVE THAT DONE IN 2024, THAT HER INSURANCE WILL GO UP $60 PER MONTH.       PLEASE CALL THE PATIENT TO PROVIDE ANY POSSIBLE APPT TIMES AS FIRST AVAILABLE WITH DR. BLUM IS JANUARY 2025.

## 2024-10-17 DIAGNOSIS — Z00.00 ROUTINE GENERAL MEDICAL EXAMINATION AT A HEALTH CARE FACILITY: Primary | ICD-10-CM

## 2024-12-13 LAB
ALBUMIN SERPL-MCNC: 3.7 G/DL (ref 3.5–5.2)
ALBUMIN/GLOB SERPL: 1.5 G/DL
ALP SERPL-CCNC: 91 U/L (ref 39–117)
ALT SERPL-CCNC: 15 U/L (ref 1–33)
APPEARANCE UR: CLEAR
AST SERPL-CCNC: 13 U/L (ref 1–32)
BACTERIA #/AREA URNS HPF: ABNORMAL /HPF
BASOPHILS # BLD AUTO: 0.08 10*3/MM3 (ref 0–0.2)
BASOPHILS NFR BLD AUTO: 0.9 % (ref 0–1.5)
BILIRUB SERPL-MCNC: 0.3 MG/DL (ref 0–1.2)
BILIRUB UR QL STRIP: NEGATIVE
BUN SERPL-MCNC: 12 MG/DL (ref 6–20)
BUN/CREAT SERPL: 15.8 (ref 7–25)
CALCIUM SERPL-MCNC: 8.9 MG/DL (ref 8.6–10.5)
CANCER AG125 SERPL-ACNC: 13.9 U/ML (ref 0–38.1)
CASTS URNS MICRO: ABNORMAL
CHLORIDE SERPL-SCNC: 106 MMOL/L (ref 98–107)
CHOLEST SERPL-MCNC: 140 MG/DL (ref 0–200)
CO2 SERPL-SCNC: 24.6 MMOL/L (ref 22–29)
COLOR UR: YELLOW
CREAT SERPL-MCNC: 0.76 MG/DL (ref 0.57–1)
CRYSTALS URNS MICRO: ABNORMAL
EGFRCR SERPLBLD CKD-EPI 2021: 104.3 ML/MIN/1.73
EOSINOPHIL # BLD AUTO: 0.24 10*3/MM3 (ref 0–0.4)
EOSINOPHIL NFR BLD AUTO: 2.8 % (ref 0.3–6.2)
EPI CELLS #/AREA URNS HPF: ABNORMAL /HPF
ERYTHROCYTE [DISTWIDTH] IN BLOOD BY AUTOMATED COUNT: 14.9 % (ref 12.3–15.4)
GLOBULIN SER CALC-MCNC: 2.4 GM/DL
GLUCOSE SERPL-MCNC: 92 MG/DL (ref 65–99)
GLUCOSE UR QL STRIP: NEGATIVE
HBA1C MFR BLD: 5.3 % (ref 4.8–5.6)
HCT VFR BLD AUTO: 37.9 % (ref 34–46.6)
HDLC SERPL-MCNC: 49 MG/DL (ref 40–60)
HGB BLD-MCNC: 12.3 G/DL (ref 12–15.9)
HGB UR QL STRIP: NEGATIVE
IMM GRANULOCYTES # BLD AUTO: 0.03 10*3/MM3 (ref 0–0.05)
IMM GRANULOCYTES NFR BLD AUTO: 0.3 % (ref 0–0.5)
KETONES UR QL STRIP: NEGATIVE
LDLC SERPL CALC-MCNC: 79 MG/DL (ref 0–100)
LDLC/HDLC SERPL: 1.63 {RATIO}
LEUKOCYTE ESTERASE UR QL STRIP: ABNORMAL
LYMPHOCYTES # BLD AUTO: 1.92 10*3/MM3 (ref 0.7–3.1)
LYMPHOCYTES NFR BLD AUTO: 22.1 % (ref 19.6–45.3)
MCH RBC QN AUTO: 26.7 PG (ref 26.6–33)
MCHC RBC AUTO-ENTMCNC: 32.5 G/DL (ref 31.5–35.7)
MCV RBC AUTO: 82.4 FL (ref 79–97)
MONOCYTES # BLD AUTO: 0.63 10*3/MM3 (ref 0.1–0.9)
MONOCYTES NFR BLD AUTO: 7.3 % (ref 5–12)
NEUTROPHILS # BLD AUTO: 5.78 10*3/MM3 (ref 1.7–7)
NEUTROPHILS NFR BLD AUTO: 66.6 % (ref 42.7–76)
NITRITE UR QL STRIP: NEGATIVE
NRBC BLD AUTO-RTO: 0 /100 WBC (ref 0–0.2)
PH UR STRIP: 5.5 [PH] (ref 5–8)
PLATELET # BLD AUTO: 332 10*3/MM3 (ref 140–450)
POTASSIUM SERPL-SCNC: 4 MMOL/L (ref 3.5–5.2)
PROT SERPL-MCNC: 6.1 G/DL (ref 6–8.5)
PROT UR QL STRIP: NEGATIVE
RBC # BLD AUTO: 4.6 10*6/MM3 (ref 3.77–5.28)
RBC #/AREA URNS HPF: ABNORMAL /HPF
SODIUM SERPL-SCNC: 140 MMOL/L (ref 136–145)
SP GR UR STRIP: 1.02 (ref 1–1.03)
TRIGL SERPL-MCNC: 56 MG/DL (ref 0–150)
UROBILINOGEN UR STRIP-MCNC: ABNORMAL MG/DL
VLDLC SERPL CALC-MCNC: 12 MG/DL (ref 5–40)
WBC # BLD AUTO: 8.68 10*3/MM3 (ref 3.4–10.8)
WBC #/AREA URNS HPF: ABNORMAL /HPF

## 2024-12-17 ENCOUNTER — OFFICE VISIT (OUTPATIENT)
Dept: FAMILY MEDICINE CLINIC | Facility: CLINIC | Age: 34
End: 2024-12-17
Payer: COMMERCIAL

## 2024-12-17 VITALS
RESPIRATION RATE: 16 BRPM | WEIGHT: 236.9 LBS | BODY MASS INDEX: 37.18 KG/M2 | TEMPERATURE: 96.8 F | HEART RATE: 76 BPM | HEIGHT: 67 IN | OXYGEN SATURATION: 97 % | SYSTOLIC BLOOD PRESSURE: 116 MMHG | DIASTOLIC BLOOD PRESSURE: 78 MMHG

## 2024-12-17 DIAGNOSIS — Z83.3 FAMILY HISTORY OF TYPE 2 DIABETES MELLITUS: ICD-10-CM

## 2024-12-17 DIAGNOSIS — E66.812 CLASS 2 OBESITY DUE TO EXCESS CALORIES WITHOUT SERIOUS COMORBIDITY WITH BODY MASS INDEX (BMI) OF 37.0 TO 37.9 IN ADULT: Primary | ICD-10-CM

## 2024-12-17 DIAGNOSIS — Z98.84 STATUS POST GASTRIC BANDING: ICD-10-CM

## 2024-12-17 DIAGNOSIS — E66.09 CLASS 2 OBESITY DUE TO EXCESS CALORIES WITHOUT SERIOUS COMORBIDITY WITH BODY MASS INDEX (BMI) OF 37.0 TO 37.9 IN ADULT: Primary | ICD-10-CM

## 2024-12-17 PROBLEM — Z00.00 ANNUAL PHYSICAL EXAM: Status: ACTIVE | Noted: 2024-12-17

## 2024-12-17 PROCEDURE — 99395 PREV VISIT EST AGE 18-39: CPT | Performed by: INTERNAL MEDICINE

## 2024-12-17 NOTE — PROGRESS NOTES
"Subjective   Precious Daniels is a 34 y.o. female. Patient is here today for   Chief Complaint   Patient presents with    Annual Exam          Vitals:    12/17/24 0916   BP: 116/78   Pulse: 76   Resp: 16   Temp: 96.8 °F (36 °C)   SpO2: 97%     Body mass index is 37.09 kg/m².    The following portions of the patient's history were reviewed and updated as appropriate: allergies, current medications, past family history, past medical history, past social history, past surgical history and problem list.    Past Medical History:   Diagnosis Date    Allergic     Anemia     Temporarily when I was 14    Anxiety     BRCA1 positive 10/16/2017    Cancer     Not me; maternal grandmother and maternal aunt    Chest pain     Deep vein thrombosis     Not me; maternal grandfather had it.    Depression     Diabetes mellitus     Not me; father, maternal grandmother, paternal grandfather    GERD (gastroesophageal reflux disease)     Heart murmur     Not me; sister has a slight one.    Hypertension     Not me, my mother and father has it.    Hypothyroidism     Injury of back     Low back pain     Threw out my back once in college    Myocardial infarction     Not me; paternal grandfather had one.    Obesity     All my life    Palpitations     Pneumonia     Had several cases of \"walking pneumonia\"    Visual impairment     Wear glasses/contacts      Allergies   Allergen Reactions    Morphine Other (See Comments)     Makes patient agitated, and angry  Makes patient agitated, and angry    Makes patient agitated, and angry  Makes patient agitated, and angry  Makes patient agitated, and angry  Makes patient agitated, and angry  Makes patient agitated, and angry  Makes patient agitated, and angry  Makes patient agitated, and angry  Makes patient agitated, and angry  Makes patient agitated, and angry  Makes patient agitated, and angry  Makes patient agitated, and angry      Molds & Smuts     Penicillins Hives, Other (See Comments) and Unknown (See " Comments)     Pt states unsure of reaction      Cefdinir Diarrhea    Gabapentin Anxiety      Social History     Socioeconomic History    Marital status:    Tobacco Use    Smoking status: Never     Passive exposure: Never    Smokeless tobacco: Never   Vaping Use    Vaping status: Never Used   Substance and Sexual Activity    Alcohol use: Yes     Alcohol/week: 2.0 standard drinks of alcohol     Types: 2 Cans of beer per week     Comment: Social - 2 cans of beer or 2 glasses wine    Drug use: No    Sexual activity: Yes     Partners: Female     Birth control/protection: None        Current Outpatient Medications:     calcium carbonate EX (TUMS EX) 750 MG chewable tablet, Chew 1 tablet Daily., Disp: , Rfl:     multivitamin (THERAGRAN) tablet tablet, , Disp: , Rfl:     Scopolamine 1 MG/3DAYS patch, Place 1 patch on the skin as directed by provider Every 72 (Seventy-Two) Hours., Disp: 4 each, Rfl: 1     EKG Procedures    Objective   Precious Daniels 34 y.o. female who presents for an Annual physical exam.   Labs results discussed in detail with the patient.  Plan to update vaccines if needed today.  History of Present Illness  History of BRCA1 positive.  Status post bilateral vasectomy.    Her gynecologist expects to do a hysterectomy and BSO in the next year or 2.        History of Present Illness  The patient is a 34-year-old female who presents for evaluation of BRCA1 positive status, urinary tract infection, weight management, and health maintenance.    She has been diagnosed with BRCA1 positive status and is under the care of Dr. Na Stubbs, her gynecologist. She underwent a mastectomy in 2022, which required 3 surgeries. She had been seeing an oncologist prior to her surgery. She is currently considering a hysterectomy, but her medical team has recommended annual ultrasounds until she approaches the age of 38. She has an upcoming ultrasound scheduled with her gynecologist on Friday. She had a consultation  with her OB-GYN last week.    She has a history of urinary tract infections, as evidenced by abnormal urinalysis results. She reports no current urinary symptoms such as pain or frequency.    She has a history of bariatric surgery and has experienced difficulty in losing weight post-surgery. Her weight peaked at 260 pounds, and she currently weighs 240 pounds. She aims to reduce her weight to 180 pounds.    She has not reviewed her recent blood work results. She expresses concern about her fasting glucose levels due to her family history of diabetes. She reports no feelings of fatigue or daytime sleepiness. She is emotionally stable and is in the process of finding a new therapist.    SOCIAL HISTORY  She works at [employer].    FAMILY HISTORY  Her father and paternal grandfather have a history of diabetes. Her grandmother had some unspecified issues. Her youngest aunt had breast cancer and  at 26.    Health Habits:  Dental Exam. up to date  Eye Exam. up to date  Exercise: 0 times/week.  Current exercise activities include: not asked    Preventative counseling  Discussed face to face the importance of healthy diet and exercise.     Lab Results (most recent)       None              Review of Systems   Constitutional: Negative.    HENT: Negative.     Eyes: Negative.    Respiratory: Negative.     Cardiovascular: Negative.    Endocrine: Negative.    Genitourinary: Negative.    Musculoskeletal: Negative.    Neurological: Negative.    Psychiatric/Behavioral: Negative.         Physical Exam  Vitals and nursing note reviewed.   Constitutional:       General: She is not in acute distress.     Appearance: Normal appearance. She is obese. She is not ill-appearing, toxic-appearing or diaphoretic.      Comments: Pleasant, pneumogram, no distress.   HENT:      Head: Normocephalic and atraumatic.      Right Ear: Tympanic membrane, ear canal and external ear normal. There is no impacted cerumen.      Left Ear: Tympanic membrane, ear  canal and external ear normal. There is no impacted cerumen.      Nose: Nose normal.      Mouth/Throat:      Mouth: Mucous membranes are moist.      Pharynx: No oropharyngeal exudate or posterior oropharyngeal erythema.   Eyes:      General: No scleral icterus.        Right eye: No discharge.         Left eye: No discharge.      Pupils: Pupils are equal, round, and reactive to light.   Neck:      Vascular: No carotid bruit.   Cardiovascular:      Rate and Rhythm: Normal rate and regular rhythm.      Heart sounds: Normal heart sounds. No murmur heard.     No gallop.   Pulmonary:      Effort: No respiratory distress.      Breath sounds: Normal breath sounds. No wheezing or rales.   Abdominal:      General: There is no distension.      Palpations: There is no mass.      Tenderness: There is no abdominal tenderness. There is no guarding or rebound.      Hernia: No hernia is present.   Genitourinary:     Comments: Deferred to gynecology.  Musculoskeletal:         General: No swelling, tenderness, deformity or signs of injury.      Cervical back: Normal range of motion and neck supple. No rigidity or tenderness.      Right lower leg: No edema.      Left lower leg: No edema.   Lymphadenopathy:      Cervical: No cervical adenopathy.   Skin:     General: Skin is warm and dry.      Coloration: Skin is not jaundiced or pale.      Findings: No bruising, erythema, lesion or rash.   Neurological:      General: No focal deficit present.      Mental Status: She is alert and oriented to person, place, and time.   Psychiatric:         Mood and Affect: Mood normal.         Behavior: Behavior normal.         Thought Content: Thought content normal.       Physical Exam  Lungs were auscultated.    Vital Signs  Weight is 240 pounds.    Results  Laboratory Studies  Blood sugar is normal. Hemoglobin A1c is less than 5.7%. LDL cholesterol is low, HDL cholesterol is 49. White blood cell count is normal. Urinalysis implies a urinary tract  infection. CA-125 is 13.9.    Assessment & Plan      Problems Addressed this Visit          Endocrine and Metabolic    Obesity, unspecified - Primary    Status post gastric banding       Family History    Family history of type 2 diabetes mellitus     Diagnoses         Codes Comments    Class 2 obesity due to excess calories without serious comorbidity with body mass index (BMI) of 37.0 to 37.9 in adult    -  Primary ICD-10-CM: E66.812, E66.09, Z68.37  ICD-9-CM: 278.00, V85.37     Status post gastric banding     ICD-10-CM: Z98.84  ICD-9-CM: V45.86     Family history of type 2 diabetes mellitus     ICD-10-CM: Z83.3  ICD-9-CM: V18.0           Assessment & Plan  1. BRCA1 positive status.  She has undergone a mastectomy and is considering a hysterectomy to further reduce her risk. She is advised to continue annual ultrasounds as recommended by her gynecologist and to consider a hysterectomy closer to age 38.    2. Urinary tract infection.  Her urinalysis results suggest a possible urinary tract infection, but the presence of nonrenal epithelial cells indicates contamination. A repeat urinalysis will be conducted today to confirm the diagnosis.    3. Weight management.  She has a history of bariatric surgery and has maintained a weight loss of 50 pounds from her heaviest weight. She is advised to consider medications like semaglutide for weight loss if her insurance covers it.    4. Health maintenance.  Her blood glucose levels are within the normal range, despite being overweight. Her cholesterol levels are excellent, with a low LDL and high HDL. She is not anemic, and her differential white blood cell count is normal. Her CA-125 level is 13.9, which is within the normal range. She is not due for colon cancer screening until she reaches 43 years of age. A Hepatitis C screening will be added to her recent lab work.    She is obese and of encouraged her to do her best to lose weight via regular aerobic activity per  American Heart Association guidelines and decrease caloric intake    Follow-up  The patient will follow up in 1 year.    PROCEDURE  The patient underwent a mastectomy in 2022, which required 3 surgeries.      There are no Patient Instructions on file for this visit.    No follow-ups on file.  Patient was given instructions and counseling regarding her  condition for health maintenance advice.  Please see specific information pulled into the AVS if appropriate.     Patient or patient representative verbalized consent for the use of Ambient Listening during the visit with  Chavez Yeung MD for chart documentation. 12/17/2024  13:56 EST

## 2024-12-18 LAB
HCV IGG SERPL QL IA: NON REACTIVE
WRITTEN AUTHORIZATION: NORMAL